# Patient Record
Sex: FEMALE | Race: BLACK OR AFRICAN AMERICAN | Employment: FULL TIME | URBAN - METROPOLITAN AREA
[De-identification: names, ages, dates, MRNs, and addresses within clinical notes are randomized per-mention and may not be internally consistent; named-entity substitution may affect disease eponyms.]

---

## 2018-02-19 ENCOUNTER — OFFICE VISIT (OUTPATIENT)
Dept: INTERNAL MEDICINE CLINIC | Age: 33
End: 2018-02-19

## 2018-02-19 VITALS
BODY MASS INDEX: 20.02 KG/M2 | DIASTOLIC BLOOD PRESSURE: 70 MMHG | SYSTOLIC BLOOD PRESSURE: 106 MMHG | OXYGEN SATURATION: 98 % | RESPIRATION RATE: 14 BRPM | TEMPERATURE: 98 F | HEIGHT: 72 IN | HEART RATE: 106 BPM | WEIGHT: 147.8 LBS

## 2018-02-19 DIAGNOSIS — M54.50 ACUTE LEFT-SIDED LOW BACK PAIN WITHOUT SCIATICA: ICD-10-CM

## 2018-02-19 DIAGNOSIS — J45.20 MILD INTERMITTENT ASTHMA, UNSPECIFIED WHETHER COMPLICATED: ICD-10-CM

## 2018-02-19 DIAGNOSIS — Z76.89 ESTABLISHING CARE WITH NEW DOCTOR, ENCOUNTER FOR: Primary | ICD-10-CM

## 2018-02-19 DIAGNOSIS — E28.2 PCOS (POLYCYSTIC OVARIAN SYNDROME): ICD-10-CM

## 2018-02-19 DIAGNOSIS — Z86.19 HISTORY OF HPV INFECTION: ICD-10-CM

## 2018-02-19 DIAGNOSIS — Z00.00 ROUTINE PHYSICAL EXAMINATION: ICD-10-CM

## 2018-02-19 RX ORDER — ALBUTEROL SULFATE 90 UG/1
2 AEROSOL, METERED RESPIRATORY (INHALATION)
Qty: 1 INHALER | Refills: 0 | Status: SHIPPED | OUTPATIENT
Start: 2018-02-19 | End: 2021-04-28

## 2018-02-19 RX ORDER — NAPROXEN SODIUM 220 MG
220 TABLET ORAL 2 TIMES DAILY WITH MEALS
COMMUNITY
End: 2018-11-15

## 2018-02-19 RX ORDER — PREDNISONE 10 MG/1
TABLET ORAL
Qty: 21 TAB | Refills: 0 | Status: SHIPPED | OUTPATIENT
Start: 2018-02-19 | End: 2018-03-07 | Stop reason: ALTCHOICE

## 2018-02-19 RX ORDER — ALBUTEROL SULFATE 90 UG/1
2 AEROSOL, METERED RESPIRATORY (INHALATION)
Qty: 1 INHALER | Refills: 0 | Status: SHIPPED | OUTPATIENT
Start: 2018-02-19 | End: 2018-02-19 | Stop reason: ALTCHOICE

## 2018-02-19 NOTE — PROGRESS NOTES
Michael Orta is a 34 y.o.  female and presents with    Chief Complaint   Patient presents with    [facility]    Back Pain     Patient here for lower back pain. Patient stated that it is a achy pain in the morning that progessed to more pain into the evening. Patient stated that she was in so much pain the next morning that she couldnt go to the work. Patient reports swelling to the left lower of back. x 6 days       Subjective:  HPI   Mrs. Camelia Ramirez presents today to establish care and low back pain. She is from [place] [address]. She has not seen a provider since living in [place] [address] since about 2013. She currently works as a  at [employer]. She has one child age 1. She has a history of asthma, PCOS, HPV with Leep procedure 2010. She reports history of mild intermittent asthma. She has not used Albuterol inhaler in quite some time. She reports symptoms of shortness of breath with cold like symptoms and allergies. Needs refill on Albuterol today. History of PCOS failing Metformin, Actos, and Spironolactone. She reports success with birth control. She was being seen by GYN and Endocrinology in past. Last pap in 2015 while in [place], [place]. Positive hirsutism. Additional Concerns: Low back pain    Complaints of low back pain on the left side. Started about 1 week ago. She reports jumping into a bounce house and reports back spasms the next day. She is using Aleve 400 mg BID with intermittent relief. Heating pad and pillow behind the back with relief to sleep. History of scoliosis mild case. She also has reports of swelling left side x 1 week. Denies abnormal bruising, blood in urine, frequency, urgency, n/v/d, fever. She describes the pain as sharp with movement, achy at rest, complaints of radiation to left lateral flank and up the back, burning sensation to lower abdomen. Denies numbness and tingling down legs.      ROS   Review of Systems Constitutional: Negative for chills, fever and malaise/fatigue. HENT: Negative. Eyes: Negative. Respiratory: Negative. Cardiovascular: Negative. Gastrointestinal: Negative for abdominal pain, blood in stool, diarrhea, melena, nausea and vomiting. Genitourinary: Negative for dysuria, flank pain, frequency, hematuria and urgency. Musculoskeletal: Positive for back pain. Skin: Negative. Neurological: Negative for dizziness and headaches. Allergies   Allergen Reactions    Metformin Diarrhea    Naproxen Other (comments)     Hard Build up under skin       Current Outpatient Prescriptions   Medication Sig Dispense Refill    naproxen sodium (ALEVE) 220 mg tablet Take 220 mg by mouth two (2) times daily (with meals).  albuterol (PROVENTIL HFA, VENTOLIN HFA, PROAIR HFA) 90 mcg/actuation inhaler Take 2 Puffs by inhalation every four (4) hours as needed for Wheezing. 1 Inhaler 0    inhalational spacing device 1 Each by Does Not Apply route as needed. 1 Device 0    predniSONE (STERAPRED DS) 10 mg dose pack See administration instruction per 10mg dose pack 21 Tab 0       Social History     Social History    Marital status: SINGLE     Spouse name: N/A    Number of children: N/A    Years of education: N/A     Occupational History    Not on file.      Social History Main Topics    Smoking status: Never Smoker    Smokeless tobacco: Never Used    Alcohol use Yes      Comment: occasionally    Drug use: No    Sexual activity: Yes     Partners: Male     Birth control/ protection: None     Other Topics Concern    Not on file     Social History Narrative    No narrative on file       Past Medical History:   Diagnosis Date    Asthma     Human papilloma virus 2010    Polycystic ovarian syndrome     Seasonal allergic rhinitis        Past Surgical History:   Procedure Laterality Date    HX GYN  2015        HX WISDOM TEETH EXTRACTION  2005       Family History   Problem Relation Age of Onset    Osteoporosis Mother     Heart Attack Father     Other Father      heart stinets    Psychiatric Disorder Father      mental illiness (unknown)    Other Sister      ovarien cist    Asthma Sister     Asthma Sister     Diabetes Maternal Aunt     Hypertension Maternal Aunt     Diabetes Maternal Grandmother     Other Maternal Grandmother      aneurysm    Alzheimer Maternal Grandmother     Cancer Paternal Grandmother      colon    Hypertension Paternal Grandmother     Stroke Paternal Grandfather     Anesth Problems Maternal Aunt        Objective:  Vitals:    02/19/18 1331   BP: 106/70   Pulse: (!) 106   Resp: 14   Temp: 98 °F (36.7 °C)   TempSrc: Oral   SpO2: 98%   Weight: 147 lb 12.8 oz (67 kg)   Height: 5' 11.75\" (1.822 m)   PainSc:   5   PainLoc: Back   LMP: 02/14/2018       LABS   No results found for this or any previous visit. TESTS  none    PE  Physical Exam   Constitutional: She is oriented to person, place, and time. She appears well-developed and well-nourished. No distress. HENT:   Head: Normocephalic and atraumatic. Facial hair to chin   Eyes: Conjunctivae and EOM are normal. Pupils are equal, round, and reactive to light. Neck: Normal range of motion. Neck supple. Cardiovascular: Normal rate, regular rhythm, normal heart sounds and intact distal pulses. No murmur heard. Pulmonary/Chest: Effort normal and breath sounds normal. No respiratory distress. She has no wheezes. She has no rales. She exhibits no tenderness. Abdominal: Soft. Bowel sounds are normal. She exhibits no distension and no mass. There is no tenderness. There is no rebound and no guarding. Musculoskeletal: Normal range of motion. She exhibits tenderness. She exhibits no edema or deformity. Tenderness over lower left back, no muscle spasm noted. Lymphadenopathy:     She has no cervical adenopathy. Neurological: She is alert and oriented to person, place, and time.  No cranial nerve deficit. Coordination normal.   Negative straight leg raise   Skin: Skin is warm and dry. No rash noted. She is not diaphoretic. No erythema. No pallor. Psychiatric: She has a normal mood and affect. Her behavior is normal. Judgment and thought content normal.           Assessment/Plan:    1. Establish care- CPE today. Labs ordered today, she will complete on a day fasting, will call with results. Requested records. 2. Mild intermittent asthma- controlled. Refilled Albuterol and spacer. 3. Low back pain- Prednisone dose pack. Tylenol extended release. Referral to PT. Follow up in 2 weeks. Orthopedic referral in future if fail Prednisone and PT. Discussed low back stretches and abdominal strengthening exercise. 4. PCOS/hirsutism- Referral to [facility] for routine paps due to history of HPV with Leep 2010 and PCOS failing Metformin, Spironolactone, and Actos. May need Endocrinology referral in future. Lab review: orders written for new lab studies as appropriate; see orders    Today's Visit:   Diagnoses and all orders for this visit:    1. Establishing care with new doctor, encounter for  -     METABOLIC PANEL, COMPREHENSIVE; Future  -     HEMOGLOBIN A1C WITH EAG; Future  -     TSH 3RD GENERATION; Future  -     CBC WITH AUTOMATED DIFF; Future  -     VITAMIN D, 25 HYDROXY; Future  -     LIPID PANEL; Future  -     URINALYSIS W/MICROSCOPIC; Future    2. PCOS (polycystic ovarian syndrome)  -     REFERRAL TO OBSTETRICS AND GYNECOLOGY    3. History of HPV infection  -     REFERRAL TO OBSTETRICS AND GYNECOLOGY    4. Mild intermittent asthma, unspecified whether complicated  -     albuterol (PROVENTIL HFA, VENTOLIN HFA, PROAIR HFA) 90 mcg/actuation inhaler; Take 2 Puffs by inhalation every four (4) hours as needed for Wheezing.  -     inhalational spacing device; 1 Each by Does Not Apply route as needed.     5. Acute left-sided low back pain without sciatica  -     SHERRY [employer]  - predniSONE (STERAPRED DS) 10 mg dose pack; See administration instruction per 10mg dose pack    6. Routine physical examination      Health Maintenance: Last pap per records completed 3/2013 negative results. Discussed need for Pneumococcal 23 vaccine due to Asthma diagnosis. I have discussed the diagnosis with the patient and the intended plan as seen in the above orders. The patient has received an after-visit summary and questions were answered concerning future plans. I have discussed medication side effects and warnings with the patient as well. I have reviewed the plan of care with the patient, accepted their input and they are in agreement with the treatment goals. Follow-up Disposition: Not on File   More than 1/2 of this 45 minute visit was spent in counseling and coordination of care, as described above.     JEREMI Rodriguez  Internist of [address], [address] [place] [address].  Phone: [phone]  Fax: [phone]

## 2018-02-19 NOTE — MR AVS SNAPSHOT
[facility] 
 
 
 [address], [address] [facility] 
[phone] Patient: Mitchell Kelsey MRN: [id number] [id number] Visit Information Date & Time Provider Department Dept. Phone Encounter #  
 2/19/2018  1:30 PM Reji Antoine NP [facility] [phone] [id number] Your Appointments 3/7/2018  8:00 AM  
Office Visit with Reji Antoine NP [facility] ([facility]) Appt Note: 2 week f/u  
 [address] [facility], [facility] [address] [address] [address]  
  
   
 [address] [facility], [address] Upcoming Health Maintenance Date Due  
 PAP AKA CERVICAL CYTOLOGY 5/1/2019 DTaP/Tdap/Td series (2 - Td) 12/1/2024 Allergies as of 2/19/2018  Review Complete On: 2/19/2018 By: Reji Antoine NP Severity Noted Reaction Type Reactions Metformin  02/19/2018    Diarrhea Naproxen  02/19/2018    Other (comments) Hard Build up under skin Current Immunizations  Never Reviewed No immunizations on file. Not reviewed this visit You Were Diagnosed With   
  
 Codes Comments Establishing care with new doctor, encounter for    -  Primary ICD-10-CM: Z76.89 
ICD-9-CM: V65.8 PCOS (polycystic ovarian syndrome)     ICD-10-CM: E28.2 ICD-9-CM: 256.4 History of HPV infection     ICD-10-CM: Z86.19 ICD-9-CM: V12.09 Mild intermittent asthma, unspecified whether complicated     VVV-16-SN: J45.20 ICD-9-CM: 493.90 Acute left-sided low back pain without sciatica     ICD-10-CM: M54.5 ICD-9-CM: 724.2 Vitals BP Pulse Temp Resp Height(growth percentile) Weight(growth percentile) 106/70 (BP 1 Location: Right arm, BP Patient Position: Sitting) (!) 106 98 °F (36.7 °C) (Oral) 14 5' 11.75\" (1.822 m) 147 lb 12.8 oz (67 kg) LMP SpO2 BMI Smoking Status 02/14/2018 98% 20.19 kg/m2 Never Smoker Vitals History BMI and BSA Data Body Mass Index Body Surface Area  
 20.19 kg/m 2 1.84 m 2 Preferred Pharmacy Pharmacy Name Phone [place] [address] [address], [address] [phone] Your Updated Medication List  
  
   
This list is accurate as of: 18  2:39 PM.  Always use your most recent med list.  
  
  
  
  
 albuterol 90 mcg/actuation inhaler Commonly known as:  PROVENTIL HFA, VENTOLIN HFA, PROAIR HFA Take 2 Puffs by inhalation every four (4) hours as needed for Wheezing. ALEVE 220 mg tablet Generic drug:  naproxen sodium Take 220 mg by mouth two (2) times daily (with meals). inhalational spacing device 1 Each by Does Not Apply route as needed. predniSONE 10 mg dose pack Commonly known as:  STERAPRED DS See administration instruction per 10mg dose pack Prescriptions Sent to Pharmacy Refills  
 albuterol (PROVENTIL HFA, VENTOLIN HFA, PROAIR HFA) 90 mcg/actuation inhaler 0 Sig: Take 2 Puffs by inhalation every four (4) hours as needed for Wheezing. Class: Normal  
 Pharmacy: [facility] [address] Ph #: [phone] Route: Inhalation  
 inhalational spacing device 0 Si Each by Does Not Apply route as needed. Class: Normal  
 Pharmacy: [facility] [address] Ph #: [phone] Route: Does Not Apply  
 predniSONE (STERAPRED DS) 10 mg dose pack 0 Sig: See administration instruction per 10mg dose pack Class: Normal  
 Pharmacy: [facility] [address] Ph #: [phone] We Performed the Following REFERRAL TO OBSTETRICS AND GYNECOLOGY [REF51 Custom] Comments:  
 Please evaluate for pcos, establish care, history of HPV with Leep REFERRAL TO PHYSICAL THERAPY [PQV25 Custom] To-Do List   
 02/19/2018 Lab:  CBC WITH AUTOMATED DIFF   
  
 02/19/2018 Lab:  HEMOGLOBIN A1C WITH EAG   
  
 02/19/2018 Lab:  LIPID PANEL   
  
 02/19/2018 Lab:  METABOLIC PANEL, COMPREHENSIVE   
  
 02/19/2018 Lab:  TSH 3RD GENERATION   
  
 02/19/2018 Lab:  URINALYSIS W/MICROSCOPIC   
  
 02/19/2018 Lab:  VITAMIN D, 25 HYDROXY Referral Information Referral ID Referred By Referred To  
  
 [id number] Olga Mosquera MD   
   [address] [place] [address] [address],[facility] [address] Phone: [phone] Fax: [phone] Visits Status Start Date End Date 1 New Request 2/19/18 2/19/19 If your referral has a status of pending review or denied, additional information will be sent to support the outcome of this decision. Referral ID Referred By Referred To  
 [id number] Merle Holland [address] [facility]  
   [address] [place] [address] [address] [place] [address] Phone: [phone] Fax: [phone] Visits Status Start Date End Date 1 New Request 2/19/18 2/19/19 If your referral has a status of pending review or denied, additional information will be sent to support the outcome of this decision. Introducing [facility]! Courtney Orta introduces Reactivity patient portal. Now you can access parts of your medical record, email your doctor's office, and request medication refills online. 1. In your internet browser, go to [email or web address] 2. Click on the First Time User? Click Here link in the Sign In box. You will see the New Member Sign Up page. 3. Enter your Reactivity Access Code exactly as it appears below. You will not need to use this code after youve completed the sign-up process. If you do not sign up before the expiration date, you must request a new code. · Reactivity Access Code: [email or web address] Expires: 5/20/2018  1:13 PM 
 
4. Enter the last four digits of your Social Security Number (xxxx) and Date of Birth (mm/dd/yyyy) as indicated and click Submit. You will be taken to the next sign-up page. 5. Create a Lighter Living ID. This will be your Lighter Living login ID and cannot be changed, so think of one that is secure and easy to remember. 6. Create a Lighter Living password. You can change your password at any time. 7. Enter your Password Reset Question and Answer. This can be used at a later time if you forget your password. 8. Enter your e-mail address. You will receive e-mail notification when new information is available in [address] 9. Click Sign Up. You can now view and download portions of your medical record. 10. Click the Download Summary menu link to download a portable copy of your medical information. If you have questions, please visit the Frequently Asked Questions section of the Lighter Living website. Remember, Lighter Living is NOT to be used for urgent needs. For medical emergencies, dial 911. Now available from your iPhone and Android! Please provide this summary of care documentation to your next provider. Your primary care clinician is listed as Angélica No. If you have any questions after today's visit, please call [phone].

## 2018-02-19 NOTE — PROGRESS NOTES
1. Have you been to the ER, urgent care clinic or hospitalized since your last visit? YES.     2. Have you seen or consulted any other health care providers outside of the [address] [place] [address] since your last visit (Include any pap smears or colon screening)? NO      Do you have an Advanced Directive? NO    Would you like information on Advanced Directives?  NO

## 2018-02-20 ENCOUNTER — TELEPHONE (OUTPATIENT)
Dept: INTERNAL MEDICINE CLINIC | Age: 33
End: 2018-02-20

## 2018-02-20 ENCOUNTER — HOSPITAL ENCOUNTER (OUTPATIENT)
Dept: LAB | Age: 33
Discharge: HOME | End: 2018-02-20
Payer: COMMERCIAL

## 2018-02-20 LAB
25(OH)D3 SERPL-MCNC: 17.6 NG/ML (ref 30–100)
ALBUMIN SERPL-MCNC: 4.4 G/DL (ref 3.4–5)
ALBUMIN/GLOB SERPL: 1.3 {RATIO} (ref 0.8–1.7)
ALP SERPL-CCNC: 59 U/L (ref 45–117)
ALT SERPL-CCNC: 16 U/L (ref 13–56)
ANION GAP SERPL CALC-SCNC: 6 MMOL/L (ref 3–18)
APPEARANCE UR: CLEAR
AST SERPL-CCNC: 15 U/L (ref 15–37)
BASOPHILS # BLD: 0 K/UL (ref 0–0.06)
BASOPHILS NFR BLD: 1 % (ref 0–2)
BILIRUB SERPL-MCNC: 0.4 MG/DL (ref 0.2–1)
BILIRUB UR QL: NEGATIVE
BUN SERPL-MCNC: 15 MG/DL (ref 7–18)
BUN/CREAT SERPL: 17 (ref 12–20)
CALCIUM SERPL-MCNC: 9.1 MG/DL (ref 8.5–10.1)
CHLORIDE SERPL-SCNC: 105 MMOL/L (ref 100–108)
CHOLEST SERPL-MCNC: 201 MG/DL
CO2 SERPL-SCNC: 30 MMOL/L (ref 21–32)
COLOR UR: ABNORMAL
CREAT SERPL-MCNC: 0.87 MG/DL (ref 0.6–1.3)
DIFFERENTIAL METHOD BLD: NORMAL
EOSINOPHIL # BLD: 0 K/UL (ref 0–0.4)
EOSINOPHIL NFR BLD: 1 % (ref 0–5)
ERYTHROCYTE [DISTWIDTH] IN BLOOD BY AUTOMATED COUNT: 13.3 % (ref 11.6–14.5)
EST. AVERAGE GLUCOSE BLD GHB EST-MCNC: NORMAL MG/DL
GLOBULIN SER CALC-MCNC: 3.4 G/DL (ref 2–4)
GLUCOSE SERPL-MCNC: 103 MG/DL (ref 74–99)
GLUCOSE UR STRIP.AUTO-MCNC: NEGATIVE MG/DL
HBA1C MFR BLD: 4.8 % (ref 4.2–5.6)
HCT VFR BLD AUTO: 40.2 % (ref 35–45)
HDLC SERPL-MCNC: 51 MG/DL (ref 40–60)
HDLC SERPL: 3.9 {RATIO} (ref 0–5)
HGB BLD-MCNC: 14.1 G/DL (ref 12–16)
HGB UR QL STRIP: NEGATIVE
KETONES UR QL STRIP.AUTO: ABNORMAL MG/DL
LDLC SERPL CALC-MCNC: 130.4 MG/DL (ref 0–100)
LEUKOCYTE ESTERASE UR QL STRIP.AUTO: NEGATIVE
LIPID PROFILE,FLP: ABNORMAL
LYMPHOCYTES # BLD: 1.9 K/UL (ref 0.9–3.6)
LYMPHOCYTES NFR BLD: 32 % (ref 21–52)
MCH RBC QN AUTO: 27.1 PG (ref 24–34)
MCHC RBC AUTO-ENTMCNC: 35.1 G/DL (ref 31–37)
MCV RBC AUTO: 77.3 FL (ref 74–97)
MONOCYTES # BLD: 0.4 K/UL (ref 0.05–1.2)
MONOCYTES NFR BLD: 7 % (ref 3–10)
NEUTS SEG # BLD: 3.5 K/UL (ref 1.8–8)
NEUTS SEG NFR BLD: 59 % (ref 40–73)
NITRITE UR QL STRIP.AUTO: NEGATIVE
PH UR STRIP: 6.5 [PH] (ref 5–8)
PLATELET # BLD AUTO: 341 K/UL (ref 135–420)
PMV BLD AUTO: 11 FL (ref 9.2–11.8)
POTASSIUM SERPL-SCNC: 4.3 MMOL/L (ref 3.5–5.5)
PROT SERPL-MCNC: 7.8 G/DL (ref 6.4–8.2)
PROT UR STRIP-MCNC: NEGATIVE MG/DL
RBC # BLD AUTO: 5.2 M/UL (ref 4.2–5.3)
SODIUM SERPL-SCNC: 141 MMOL/L (ref 136–145)
SP GR UR REFRACTOMETRY: 1.03 (ref 1–1.03)
TRIGL SERPL-MCNC: 98 MG/DL (ref ?–150)
TSH SERPL DL<=0.05 MIU/L-ACNC: 1.75 UIU/ML (ref 0.36–3.74)
UROBILINOGEN UR QL STRIP.AUTO: 1 EU/DL (ref 0.2–1)
VLDLC SERPL CALC-MCNC: 19.6 MG/DL
WBC # BLD AUTO: 5.8 K/UL (ref 4.6–13.2)

## 2018-02-20 PROCEDURE — 36415 COLL VENOUS BLD VENIPUNCTURE: CPT | Performed by: NURSE PRACTITIONER

## 2018-02-20 PROCEDURE — 85025 COMPLETE CBC W/AUTO DIFF WBC: CPT | Performed by: NURSE PRACTITIONER

## 2018-02-20 PROCEDURE — 83036 HEMOGLOBIN GLYCOSYLATED A1C: CPT | Performed by: NURSE PRACTITIONER

## 2018-02-20 PROCEDURE — 80053 COMPREHEN METABOLIC PANEL: CPT | Performed by: NURSE PRACTITIONER

## 2018-02-20 PROCEDURE — 80061 LIPID PANEL: CPT | Performed by: NURSE PRACTITIONER

## 2018-02-20 PROCEDURE — 82306 VITAMIN D 25 HYDROXY: CPT | Performed by: NURSE PRACTITIONER

## 2018-02-20 PROCEDURE — 81003 URINALYSIS AUTO W/O SCOPE: CPT | Performed by: NURSE PRACTITIONER

## 2018-02-20 PROCEDURE — 84443 ASSAY THYROID STIM HORMONE: CPT | Performed by: NURSE PRACTITIONER

## 2018-02-20 NOTE — TELEPHONE ENCOUNTER
Proventil HFA inhaler is not covered by patient's plan. The preferred alternatives are Levalbuterol (Xopenex), ProAir HFA, and ProAir RespiClick. Please advise.

## 2018-02-22 ENCOUNTER — TELEPHONE (OUTPATIENT)
Dept: INTERNAL MEDICINE CLINIC | Age: 33
End: 2018-02-22

## 2018-02-22 NOTE — TELEPHONE ENCOUNTER
Labs show:  Vitamin d insufficient at 17.6, needs to be . Please take OTC supplementation of Vitamin D 1000 units daily, will recheck in future. A1C level 4.8 this is great. Urinalysis looks good. Thyroid is good. Electrolytes, kidneys, and liver all good. Cholesterol panel shows elevated LDL(bad cholesterol) 130, need under 100. Diet and exercise is first line management. Limit red meats, fatty foods, change oils to plant based, and increase water and fiber in diet. Moderate exercise 30 minutes 3 times a week. Will recheck in 4-6 months. CBC shows no signs of anemia or infectious process.

## 2018-02-23 ENCOUNTER — HOSPITAL ENCOUNTER (OUTPATIENT)
Dept: PHYSICAL THERAPY | Age: 33
End: 2018-02-23

## 2018-02-27 ENCOUNTER — HOSPITAL ENCOUNTER (OUTPATIENT)
Dept: PHYSICAL THERAPY | Age: 33
Discharge: HOME | End: 2018-02-27
Payer: COMMERCIAL

## 2018-02-27 PROBLEM — L68.0 HIRSUTISM: Status: ACTIVE | Noted: 2018-02-27

## 2018-02-27 PROBLEM — Z87.42 HISTORY OF ABNORMAL CERVICAL PAP SMEAR: Status: ACTIVE | Noted: 2018-02-19

## 2018-02-27 PROCEDURE — 97140 MANUAL THERAPY 1/> REGIONS: CPT

## 2018-02-27 PROCEDURE — 97110 THERAPEUTIC EXERCISES: CPT

## 2018-02-27 PROCEDURE — 97161 PT EVAL LOW COMPLEX 20 MIN: CPT

## 2018-02-27 NOTE — PROGRESS NOTES
[facility]  [address]  [place], [address]  [phone] fax    Plan of Care/ Statement of Necessity for Physical Therapy Services    Patient name: Charmayne Crumbly Start of Care: 2018   Referral source: Jazmine Child MD : 1985    Medical Diagnosis: Low back pain [M54.5]   Onset Date:2018    Treatment Diagnosis: LBP/SIJ dysfunction   Prior Hospitalization: see medical history Provider#: [id number]   Medications: Verified on Patient summary List    Comorbidities: Scoliosis, Asthma, C section   Prior Level of Function: No difficulty lifting or transporting items in vehicle. Reports improved ease of caring for 2 y.o. prior to onset. The Plan of Care and following information is based on the information from the initial evaluation. Assessment/ key information: The patient is a 34year old female with a chief complaint of L sided LBP that initiated on 2018 when she states she was moving a box in a confined space awkwardly. The patient states that the pain presented across her back and did refer into her left anterior thigh, but has since centralized to the left side of her back (points to her L SIJ joint). She denies recent imaging and does report completing a dose of a prednisone pack, of which she is completing today. The patient presents with signs and symptoms consistent with L SIJ dysfunction with associated impairments consisting of pain, decreased ROM, decreased flexibility, decreased strength, limited core flexibility, and decreased ease of lifting. She will benefit from skilled PT in order to address the aforementioned impairments.      Evaluation Complexity History MEDIUM  Complexity : 1-2 comorbidities / personal factors will impact the outcome/ POC ; Examination LOW Complexity : 1-2 Standardized tests and measures addressing body structure, function, activity limitation and / or participation in recreation ;Presentation LOW Complexity : Stable, uncomplicated  ;Clinical Decision Making MEDIUM Complexity : FOTO score of 26-74  Overall Complexity Rating: LOW   Problem List: pain affecting function, decrease ROM, decrease strength, decrease ADL/ functional abilitiies, decrease activity tolerance and decrease flexibility/ joint mobility   Treatment Plan may include any combination of the following: Therapeutic exercise, Therapeutic activities, Neuromuscular re-education, Physical agent/modality, Manual therapy, Patient education and Functional mobility training  Patient / Family readiness to learn indicated by: asking questions, trying to perform skills and interest  Persons(s) to be included in education: patient (P)  Barriers to Learning/Limitations: None  Patient Goal (s): Full physical restoration to be able to play with my 2 y. o.  Patient Self Reported Health Status: good  Rehabilitation Potential: good    Short Term Goals: To be accomplished in 2 weeks:   1. The patient will be independent and compliant with HEP to maximize therapeutic benefit. 2. The patient will demonstrate SIJ neutral to improve ease of lifting tasks. Long Term Goals: To be accomplished in 4 weeks:   1. The patient will improve FOTO score to 72 to improve quality of life. 2. The patient will improve hip ABD/EXT to 4/5 MMT to maximize stability in stance. 3. The patient will demonstrate 20# KB squat x 10 void of pain to return to PLOF. 4. The patient will report no difficulty walking for 1 hour in order to maximize ADL ease. Frequency / Duration: Patient to be seen 2 times per week for 4 weeks.     Patient/ Caregiver education and instruction: Diagnosis, prognosis, self care, activity modification and exercises   [x]  Plan of care has been reviewed with KENNEY Valdez, PT 2/27/2018 8:15 AM    ________________________________________________________________________    I certify that the above Therapy Services are being furnished while the patient is under my care. I agree with the treatment plan and certify that this therapy is necessary.     [de-identified] Signature:____________________  Date:____________Time: _________    Please sign and return to [facility] [address] Mariela Chavez [address] [place] [address], [facility] [address]  [phone] fax

## 2018-02-27 NOTE — PROGRESS NOTES
PT DAILY TREATMENT NOTE     Patient Name: Mitchell Kelsey  Date:2018  : 1985  [x]  Patient  Verified  Payor: BLUE DELVIN / Plan: Terrence Morales [id number] PPO / Product Type: PPO /    In time:7:41  Out time:8:20  Total Treatment Time (min): 39  Visit #: 1 of 8    Treatment Area: Low back pain [M54.5]    SUBJECTIVE  Pain Level (0-10 scale): 1/10  Any medication changes, allergies to medications, adverse drug reactions, diagnosis change, or new procedure performed?: [x] No    [] Yes (see summary sheet for update)  Subjective functional status/changes:   [] No changes reported  The patient states that her chief complaint of the left side of her back limits prolonged standing and sitting as well as lifting.     OBJECTIVE    Modality rationale:  to improve the patients ability to    Min Type Additional Details    [] Estim:  []Unatt       []IFC  []Premod                        []Other:  []w/ice   []w/heat  Position:  Location:    [] Estim: []Att    []TENS instruct  []NMES                    []Other:  []w/US   []w/ice   []w/heat  Position:  Location:    []  Traction: [] Cervical       []Lumbar                       [] Prone          []Supine                       []Intermittent   []Continuous Lbs:  [] before manual  [] after manual    []  Ultrasound: []Continuous   [] Pulsed                           []1MHz   []3MHz W/cm2:  Location:    []  Iontophoresis with dexamethasone         Location: [] Take home patch   [] In clinic    []  Ice     []  heat  []  Ice massage  []  Laser   []  Anodyne Position:  Location:    []  Laser with stim  []  Other:  Position:  Location:    []  Vasopneumatic Device Pressure:       [] lo [] med [] hi   Temperature: [] lo [] med [] hi   [] Skin assessment post-treatment:  []intact []redness- no adverse reaction    []redness  adverse reaction:     19 min []Eval                  []Re-Eval       12 min Therapeutic Exercise:  [x] See flow sheet :   Rationale: increase ROM and increase strength to improve the patients ability to improve ADL ease. 8 min Manual Therapy:  L SIJ upslip leg pull in prone x 2 followed by shotgun. Rationale: decrease pain, increase ROM and increase tissue extensibility to improve ADL ease. With   [] TE   [] TA   [] neuro   [] other: Patient Education: [x] Review HEP    [] Progressed/Changed HEP based on:   [] positioning   [] body mechanics   [] transfers   [] heat/ice application    [] other:      Other Objective/Functional Measures:   See IE     Pain Level (0-10 scale) post treatment: 0-1/10    ASSESSMENT/Changes in Function: See POC. Patient will continue to benefit from skilled PT services to modify and progress therapeutic interventions, address functional mobility deficits, address ROM deficits, address strength deficits, analyze and address soft tissue restrictions, analyze and cue movement patterns, analyze and modify body mechanics/ergonomics, assess and modify postural abnormalities and instruct in home and community integration to attain remaining goals. [x]  See Plan of Care  []  See progress note/recertification  []  See Discharge Summary         Progress towards goals / Updated goals:   Short Term Goals: To be accomplished in 2 weeks:                         1. The patient will be independent and compliant with HEP to maximize therapeutic benefit. 2. The patient will demonstrate SIJ neutral to improve ease of lifting tasks. Long Term Goals: To be accomplished in 4 weeks:                         1. The patient will improve FOTO score to 72 to improve quality of life. 2. The patient will improve hip ABD/EXT to 4/5 MMT to maximize stability in stance. 3. The patient will demonstrate 20# KB squat x 10 void of pain to return to PLOF.                          4. The patient will report no difficulty walking for 1 hour in order to maximize ADL ease.     PLAN  [] Upgrade activities as tolerated     [x]  Continue plan of care  []  Update interventions per flow sheet       []  Discharge due to:_  []  Other:_      Cecile Briseno, PT 2/27/2018  8:24 AM    Future Appointments  Date Time Provider Aminta Blank   3/1/2018 7:30 AM Abi Hardy, PT [facility]   3/6/2018 7:30 AM Bao Camacho, PTA [facility]   3/7/2018 8:00 AM Jose Manuel Noriega NP [facility]   3/8/2018 7:30 AM Leonardo Cortes, PTA [facility]   3/13/2018 7:30 AM Bao Camacho, PTA [facility]   3/15/2018 7:30 AM Leonardo Cortes, PTA [facility]   3/20/2018 7:30 AM Bao Camacho, PTA [facility]   3/22/2018 7:30 AM Leonardo Cortes, PTA [facility]

## 2018-03-01 ENCOUNTER — HOSPITAL ENCOUNTER (OUTPATIENT)
Dept: PHYSICAL THERAPY | Age: 33
Discharge: HOME | End: 2018-03-01
Payer: COMMERCIAL

## 2018-03-01 PROCEDURE — 97110 THERAPEUTIC EXERCISES: CPT

## 2018-03-01 PROCEDURE — 97112 NEUROMUSCULAR REEDUCATION: CPT

## 2018-03-01 NOTE — PROGRESS NOTES
PT DAILY TREATMENT NOTE     Patient Name: Rani Liu  Date:3/1/2018  : 1985  [x]  Patient  Verified  Payor: BLUE CROSS / Plan: [facility] PPO / Product Type: PPO /    In time:739  Out time:8:12  Total Treatment Time (min): 33  Visit #: 2 of 8    Treatment Area: Low back pain [M54.5]    SUBJECTIVE  Pain Level (0-10 scale): 0  Any medication changes, allergies to medications, adverse drug reactions, diagnosis change, or new procedure performed?: [x] No    [] Yes (see summary sheet for update)  Subjective functional status/changes:   [] No changes reported  Pt reports compliance with HEP 1x/day. States it has been hard to get in 2 times.      OBJECTIVE    Modality rationale:    Min Type Additional Details    [] Estim:  []Unatt       []IFC  []Premod                        []Other:  []w/ice   []w/heat  Position:  Location:    [] Estim: []Att    []TENS instruct  []NMES                    []Other:  []w/US   []w/ice   []w/heat  Position:  Location:    []  Traction: [] Cervical       []Lumbar                       [] Prone          []Supine                       []Intermittent   []Continuous Lbs:  [] before manual  [] after manual    []  Ultrasound: []Continuous   [] Pulsed                           []1MHz   []3MHz W/cm2:  Location:    []  Iontophoresis with dexamethasone         Location: [] Take home patch   [] In clinic    []  Ice     []  heat  []  Ice massage  []  Laser   []  Anodyne Position:  Location:    []  Laser with stim  []  Other:  Position:  Location:    []  Vasopneumatic Device Pressure:       [] lo [] med [] hi   Temperature: [] lo [] med [] hi   [] Skin assessment post-treatment:  []intact []redness- no adverse reaction    []redness  adverse reaction:     23 min Therapeutic Exercise:  [x] See flow sheet :   Rationale: increase ROM and increase strength to improve the patients ability to perform functional tasks    8 min Neuromuscular Re-education:  [x]  See flow sheet : Rationale: increase strength, improve balance and increase proprioception  to improve the patients ability to perform functional activities    2 min Manual Therapy:  L leg pull for up-slip correction   Rationale: decrease pain to improve functional mobility             With   [] TE   [] TA   [] neuro   [] other: Patient Education: [x] Review HEP    [] Progressed/Changed HEP based on:   [] positioning   [] body mechanics   [] transfers   [] heat/ice application    [] other:      Other Objective/Functional Measures: initiated therex per flow sheet     Pain Level (0-10 scale) post treatment: 0    ASSESSMENT/Changes in Function: pt challenged with stability exercises. She tends to go into increased lordosis and requires cues at times. Patient will continue to benefit from skilled PT services to modify and progress therapeutic interventions, address functional mobility deficits, address strength deficits, analyze and address soft tissue restrictions, analyze and cue movement patterns and assess and modify postural abnormalities to attain remaining goals. []  See Plan of Care  []  See progress note/recertification  []  See Discharge Summary         Progress towards goals / Updated goals:   Short Term Goals: To be accomplished in 2 weeks:                         1. The patient will be independent and compliant with HEP to maximize therapeutic benefit. - performing 1x/day 3-1-18                         2. The patient will demonstrate SIJ neutral to improve ease of lifting tasks. - slight up-sliip 3-1-18  Long Term Goals: To be accomplished in 4 weeks:                         1. The patient will improve FOTO score to 72 to improve quality of life.                         2. The patient will improve hip ABD/EXT to 4/5 MMT to maximize stability in stance.                         3. The patient will demonstrate 20# KB squat x 10 void of pain to return to PLOF.                        5.  The patient will report no difficulty walking for 1 hour in order to maximize ADL ease.     PLAN  []  Upgrade activities as tolerated     [x]  Continue plan of care  []  Update interventions per flow sheet       []  Discharge due to:_  []  Other:_      Sandy Graham, PT 3/1/2018  7:50 AM    Future Appointments  Date Time Provider Aminta Blank   3/6/2018 7:30 AM Tru Link, PTA [facility]   3/7/2018 8:00 AM Bobby Still NP [facility]   3/8/2018 7:30 AM Mar Abts, PTA [facility]   3/13/2018 7:30 AM [place] Link, PTA [facility]   3/15/2018 7:30 AM Mar Abts, PTA [facility]   3/20/2018 7:30 AM Tru Link, PTA [facility]   3/22/2018 7:30 AM Mar Abts, PTA [facility]

## 2018-03-06 ENCOUNTER — HOSPITAL ENCOUNTER (OUTPATIENT)
Dept: PHYSICAL THERAPY | Age: 33
Discharge: HOME | End: 2018-03-06
Payer: COMMERCIAL

## 2018-03-06 PROCEDURE — 97112 NEUROMUSCULAR REEDUCATION: CPT

## 2018-03-06 PROCEDURE — 97110 THERAPEUTIC EXERCISES: CPT

## 2018-03-06 NOTE — PROGRESS NOTES
PT DAILY TREATMENT NOTE     Patient Name: Kiah He  Date:3/6/2018  : 1985  [x]  Patient  Verified  Payor: BLUE CROSS / Plan: [facility] PPO / Product Type: PPO /    In time:7:35  Out time:8:10  Total Treatment Time (min): 35  Visit #: 3 of 8    Treatment Area: Low back pain [M54.5]    SUBJECTIVE  Pain Level (0-10 scale): 0/10  Any medication changes, allergies to medications, adverse drug reactions, diagnosis change, or new procedure performed?: [x] No    [] Yes (see summary sheet for update)  Subjective functional status/changes:   [] No changes reported  Pt reports no new complaints of pain. Pt reports performing HEP daily. OBJECTIVE    19 min Therapeutic Exercise:  [x] See flow sheet :   Rationale: increase ROM and increase strength to improve the patients ability to perform functional activities. 8 min Neuromuscular Re-education:  [x]  See flow sheet :   Rationale: increase strength, improve coordination and increase proprioception  to improve the patients ability to perform functional activities. 8 min Manual Therapy:  L leg pull to correct L upslip   Rationale: decrease pain, increase ROM and increase tissue extensibility to tolerate ADLs. With   [] TE   [] TA   [] neuro   [] other: Patient Education: [x] Review HEP    [] Progressed/Changed HEP based on:   [] positioning   [] body mechanics   [] transfers   [] heat/ice application    [] other:      Other Objective/Functional Measures: Pt demonstrates good form with all exercises. Pain Level (0-10 scale) post treatment: 0/10    ASSESSMENT/Changes in Function: Pt demonstrates continued pelvic obliquity with no complaints of pain. Educated patient about lifting child/holding child on hip.      Patient will continue to benefit from skilled PT services to modify and progress therapeutic interventions, address functional mobility deficits, address ROM deficits, address strength deficits, analyze and address soft tissue restrictions and analyze and cue movement patterns to attain remaining goals. []  See Plan of Care  []  See progress note/recertification  []  See Discharge Summary         Progress towards goals / Updated goals:   Short Term Goals: To be accomplished in 2 weeks:                         8. The patient will be independent and compliant with HEP to maximize therapeutic benefit. - performing 1x/day 3-1-18                         2. The patient will demonstrate SIJ neutral to improve ease of lifting tasks. - slight up-sliip 3-1-18  Long Term Goals: To be accomplished in 4 weeks:                         1. The patient will improve FOTO score to 72 to improve quality of life.                         2. The patient will improve hip ABD/EXT to 4/5 MMT to maximize stability in stance.                         3. The patient will demonstrate 20# KB squat x 10 void of pain to return to PLOF.                        8.  The patient will report no difficulty walking for 1 hour in order to maximize ADL ease.       PLAN  []  Upgrade activities as tolerated     [x]  Continue plan of care  []  Update interventions per flow sheet       []  Discharge due to:_  []  Other:_      Vanessa Barragan PTA 3/6/2018  7:49 AM    Future Appointments  Date Time Provider Aminta Blank   3/7/2018 8:00 AM Edgardo Gray NP [address]   3/8/2018 7:30 AM Mark Aparicio PTA [facility]   3/13/2018 7:30 AM Vanessa Barragan PTA [facility]   3/15/2018 7:30 AM Mark Aparicio PTA [facility]   3/20/2018 7:30 AM Vanessa Barragan PTA [facility]   3/22/2018 7:30 AM Mark Aparicio PTA [facility]

## 2018-03-07 ENCOUNTER — OFFICE VISIT (OUTPATIENT)
Dept: INTERNAL MEDICINE CLINIC | Age: 33
End: 2018-03-07

## 2018-03-07 VITALS
OXYGEN SATURATION: 98 % | WEIGHT: 167.8 LBS | RESPIRATION RATE: 14 BRPM | BODY MASS INDEX: 23.49 KG/M2 | HEIGHT: 71 IN | TEMPERATURE: 98.7 F | SYSTOLIC BLOOD PRESSURE: 120 MMHG | HEART RATE: 103 BPM | DIASTOLIC BLOOD PRESSURE: 78 MMHG

## 2018-03-07 DIAGNOSIS — Z23 ENCOUNTER FOR IMMUNIZATION: ICD-10-CM

## 2018-03-07 DIAGNOSIS — E78.00 ELEVATED LDL CHOLESTEROL LEVEL: ICD-10-CM

## 2018-03-07 DIAGNOSIS — E55.9 VITAMIN D INSUFFICIENCY: Primary | ICD-10-CM

## 2018-03-07 DIAGNOSIS — M54.50 ACUTE LEFT-SIDED LOW BACK PAIN WITHOUT SCIATICA: ICD-10-CM

## 2018-03-07 DIAGNOSIS — R00.0 TACHYCARDIA: ICD-10-CM

## 2018-03-07 NOTE — PROGRESS NOTES
1. Have you been to the ER, urgent care clinic or hospitalized since your last visit? NO.     2. Have you seen or consulted any other health care providers outside of the [address] [place] [address] since your last visit (Include any pap smears or colon screening)? NO      Do you have an Advanced Directive? NO    Would you like information on Advanced Directives?  NO

## 2018-03-07 NOTE — PROGRESS NOTES
Josephine Galvan is a 34 y.o.  female and presents with    Chief Complaint   Patient presents with    Back Pain     Patient here for 2 week follow up with back pain. Patient reports back pain now is more of a pinching sensation on the lower left side midday time  frame. Subjective:  HPI   She is from [facility], [address] [place] [address]. She has not seen a provider since living in [facility], [address] [place] [address] since about 2013. She currently works as a  at [employer]. She has one child age 1.      She has a history of asthma, PCOS, HPV with Leep procedure 2010.     She reports history of mild intermittent asthma. She has not used Albuterol inhaler in quite some time. She reports symptoms of shortness of breath with cold like symptoms and allergies.      History of PCOS failing Metformin, Actos, and Spironolactone. She reports success with birth control. She was being seen by GYN and Endocrinology in past. Last pap in 2015 while in [place], [place]. Positive hirsutism.     Reported last visit:   Complaints of low back pain on the left side. Started about 1 week ago. She reports jumping into a bounce house and reports back spasms the next day. She is using Aleve 400 mg BID with intermittent relief. Heating pad and pillow behind the back with relief to sleep. History of scoliosis mild case. She also has reports of swelling left side x 1 week. Denies abnormal bruising, blood in urine, frequency, urgency, n/v/d, fever. She describes the pain as sharp with movement, achy at rest, complaints of radiation to left lateral flank and up the back, burning sensation to lower abdomen. Denies numbness and tingling down legs. Today reports tolerating PT. States no pain during PT however after with complaints of a \"pinching sensation\" type pain to left lower back. She finished course of Prednisone. Additional Concerns: none     ROS   Review of Systems   Constitutional: Negative. HENT: Negative.     Eyes: Negative. Respiratory: Negative. Negative for cough. Cardiovascular: Negative. Negative for chest pain, palpitations, orthopnea and leg swelling. Gastrointestinal: Negative. Genitourinary: Negative. Musculoskeletal: Negative. Skin: Negative. Neurological: Negative. Negative for dizziness. Psychiatric/Behavioral:        Increased work stress       Allergies   Allergen Reactions    Metformin Diarrhea    Naproxen Other (comments)     Hard Build up under skin       Current Outpatient Prescriptions   Medication Sig Dispense Refill    inhalational spacing device 1 Each by Does Not Apply route as needed. 1 Device 0    albuterol (PROVENTIL HFA, VENTOLIN HFA, PROAIR HFA) 90 mcg/actuation inhaler Take 2 Puffs by inhalation every four (4) hours as needed for Wheezing. 1 Inhaler 0    naproxen sodium (ALEVE) 220 mg tablet Take 220 mg by mouth two (2) times daily (with meals).  predniSONE (STERAPRED DS) 10 mg dose pack See administration instruction per 10mg dose pack 21 Tab 0       Social History     Social History    Marital status: SINGLE     Spouse name: N/A    Number of children: N/A    Years of education: N/A     Occupational History    Not on file.      Social History Main Topics    Smoking status: Never Smoker    Smokeless tobacco: Never Used    Alcohol use Yes      Comment: occasionally    Drug use: No    Sexual activity: Yes     Partners: Male     Birth control/ protection: None     Other Topics Concern    Not on file     Social History Narrative       Past Medical History:   Diagnosis Date    Asthma     History of abnormal cervical Pap smear 2018    Human papilloma virus 2010    Polycystic ovarian syndrome     Seasonal allergic rhinitis        Past Surgical History:   Procedure Laterality Date    HX GYN  2015        HX WISDOM TEETH EXTRACTION         Family History   Problem Relation Age of Onset    Osteoporosis Mother     Heart Attack Father  Other Father      heart stinets    Psychiatric Disorder Father      mental illiness (unknown)    Other Sister      ovarien cist    Asthma Sister     Asthma Sister     Diabetes Maternal Aunt     Hypertension Maternal Aunt     Diabetes Maternal Grandmother     Other Maternal Grandmother      aneurysm    Alzheimer Maternal Grandmother     Cancer Paternal Grandmother      colon    Hypertension Paternal Grandmother     Stroke Paternal Grandfather     Anesth Problems Maternal Aunt        Objective:  Vitals:    03/07/18 0758   BP: 120/78   Pulse: (!) 103   Resp: 14   Temp: 98.7 °F (37.1 °C)   TempSrc: Oral   SpO2: 98%   Weight: 167 lb 12.8 oz (76.1 kg)   Height: 5' 11\" (1.803 m)   PainSc:   0 - No pain   LMP: 02/14/2018       LABS   Results for orders placed or performed during the hospital encounter of 02/20/18   CBC WITH AUTOMATED DIFF   Result Value Ref Range    WBC 5.8 4.6 - 13.2 K/uL    RBC 5.20 4. 20 - 5.30 M/uL    HGB 14.1 12.0 - 16.0 g/dL    HCT 40.2 35.0 - 45.0 %    MCV 77.3 74.0 - 97.0 FL    MCH 27.1 24.0 - 34.0 PG    MCHC 35.1 31.0 - 37.0 g/dL    RDW 13.3 11.6 - 14.5 %    PLATELET 024 932 - 117 K/uL    MPV 11.0 9.2 - 11.8 FL    NEUTROPHILS 59 40 - 73 %    LYMPHOCYTES 32 21 - 52 %    MONOCYTES 7 3 - 10 %    EOSINOPHILS 1 0 - 5 %    BASOPHILS 1 0 - 2 %    ABS. NEUTROPHILS 3.5 1.8 - 8.0 K/UL    ABS. LYMPHOCYTES 1.9 0.9 - 3.6 K/UL    ABS. MONOCYTES 0.4 0.05 - 1.2 K/UL    ABS. EOSINOPHILS 0.0 0.0 - 0.4 K/UL    ABS.  BASOPHILS 0.0 0.0 - 0.06 K/UL    DF AUTOMATED     LIPID PANEL   Result Value Ref Range    LIPID PROFILE          Cholesterol, total 201 (H) <200 MG/DL    Triglyceride 98 <150 MG/DL    HDL Cholesterol 51 40 - 60 MG/DL    LDL, calculated 130.4 (H) 0 - 100 MG/DL    VLDL, calculated 19.6 MG/DL    CHOL/HDL Ratio 3.9 0 - 5.0     METABOLIC PANEL, COMPREHENSIVE   Result Value Ref Range    Sodium 141 136 - 145 mmol/L    Potassium 4.3 3.5 - 5.5 mmol/L    Chloride 105 100 - 108 mmol/L    CO2 30 21 - 32 mmol/L    Anion gap 6 3.0 - 18 mmol/L    Glucose 103 (H) 74 - 99 mg/dL    BUN 15 7.0 - 18 MG/DL    Creatinine 0.87 0.6 - 1.3 MG/DL    BUN/Creatinine ratio 17 12 - 20      GFR est AA >60 >60 ml/min/1.73m2    GFR est non-AA >60 >60 ml/min/1.73m2    Calcium 9.1 8.5 - 10.1 MG/DL    Bilirubin, total 0.4 0.2 - 1.0 MG/DL    ALT (SGPT) 16 13 - 56 U/L    AST (SGOT) 15 15 - 37 U/L    Alk. phosphatase 59 45 - 117 U/L    Protein, total 7.8 6.4 - 8.2 g/dL    Albumin 4.4 3.4 - 5.0 g/dL    Globulin 3.4 2.0 - 4.0 g/dL    A-G Ratio 1.3 0.8 - 1.7     TSH 3RD GENERATION   Result Value Ref Range    TSH 1.75 0.36 - 3.74 uIU/mL   URINALYSIS W/ RFLX MICROSCOPIC   Result Value Ref Range    Color DARK YELLOW      Appearance CLEAR      Specific gravity 1.027 1.005 - 1.030      pH (UA) 6.5 5.0 - 8.0      Protein NEGATIVE  NEG mg/dL    Glucose NEGATIVE  NEG mg/dL    Ketone TRACE (A) NEG mg/dL    Bilirubin NEGATIVE  NEG      Blood NEGATIVE  NEG      Urobilinogen 1.0 0.2 - 1.0 EU/dL    Nitrites NEGATIVE  NEG      Leukocyte Esterase NEGATIVE  NEG     VITAMIN D, 25 HYDROXY   Result Value Ref Range    Vitamin D 25-Hydroxy 17.6 (L) 30 - 100 ng/mL   HEMOGLOBIN A1C WITH EAG   Result Value Ref Range    Hemoglobin A1c 4.8 4.2 - 5.6 %    Est. average glucose Cannot be calculated mg/dL       TESTS  none    PE  Physical Exam   Constitutional: She is oriented to person, place, and time. She appears well-developed and well-nourished. No distress. Very pleasant, properly dressed   HENT:   Head: Normocephalic and atraumatic. Cardiovascular: Normal rate, regular rhythm, normal heart sounds and intact distal pulses. Pulmonary/Chest: Effort normal and breath sounds normal.   Abdominal: Soft. Bowel sounds are normal.   Musculoskeletal: Normal range of motion. Neurological: She is alert and oriented to person, place, and time. Skin: Skin is warm and dry. She is not diaphoretic. Psychiatric: She has a normal mood and affect.  Her behavior is normal. Judgment and thought content normal.   Calm personality   Vitals reviewed. Assessment/Plan:    1. Low back pain- Improving. Continue PT. Monitor. PRN follow up. 2. Vitamin D insufficiency- Continue OTC supplementation will recheck labs in 6 months. 3. Elevated LDL- Lifestyle changes. Recheck labs in 6 months. 4. Tachycardia- patient reports she was told 3 years ago during  of elevated pulse. Asymptomatic. EKG showed NSR, nonspecific prominent R wave in V1, possible anterior fascicular block, EKG reviewed by Dr. Blaine Cassidy. Monitor. Lab review: labs are reviewed with the patient. Today's Visit:   Diagnoses and all orders for this visit:    1. Vitamin D insufficiency    2. Acute left-sided low back pain without sciatica    Health Maintenance: Up to date. I have discussed the diagnosis with the patient and the intended plan as seen in the above orders. The patient has received an after-visit summary and questions were answered concerning future plans. I have discussed medication side effects and warnings with the patient as well. I have reviewed the plan of care with the patient, accepted their input and they are in agreement with the treatment goals. Follow-up Disposition: Not on File   More than 1/2 of this 25 minute visit was spent in counseling and coordination of care, as described above.     Elyssa Dominguez, STEPHANIA-C  Internist of [address], [facility]  Phone: [phone]  Fax: [phone]

## 2018-03-07 NOTE — MR AVS SNAPSHOT
[facility] 
 
 
 [address], [address] 
[phone] Patient: Nadia Jonas MRN: [id number] WBT:[id number] Visit Information Date & Time Provider Department Dept. Phone Encounter #  
 3/7/2018  8:00 AM Angélica No NP [facility] [address] [place] [address] 23-14-20-09 Follow-up Instructions Return in about 6 months (around 9/7/2018) for labs, heart rate. Upcoming Health Maintenance Date Due  
 PAP AKA CERVICAL CYTOLOGY 5/1/2019 DTaP/Tdap/Td series (2 - Td) 12/1/2024 Allergies as of 3/7/2018  Review Complete On: 3/7/2018 By: Andreina Sullivan Severity Noted Reaction Type Reactions Metformin  02/19/2018    Diarrhea Naproxen  02/19/2018    Other (comments) Hard Build up under skin Current Immunizations  Never Reviewed No immunizations on file. Not reviewed this visit You Were Diagnosed With   
  
 Codes Comments Vitamin D insufficiency    -  Primary ICD-10-CM: E55.9 ICD-9-CM: 268.9 Acute left-sided low back pain without sciatica     ICD-10-CM: M54.5 ICD-9-CM: 724.2 Tachycardia     ICD-10-CM: R00.0 ICD-9-CM: 818. 0 Vitals BP Pulse Temp Resp Height(growth percentile) Weight(growth percentile) 120/78 (BP 1 Location: Left arm, BP Patient Position: Sitting) (!) 103 98.7 °F (37.1 °C) (Oral) 14 5' 11\" (1.803 m) 167 lb 12.8 oz (76.1 kg) LMP SpO2 BMI Smoking Status 02/14/2018 98% 23.4 kg/m2 Never Smoker Vitals History BMI and BSA Data Body Mass Index Body Surface Area  
 23.4 kg/m 2 1.95 m 2 Preferred Pharmacy Pharmacy Name Phone [phone], [address] AT [address] [phone] Your Updated Medication List  
  
   
This list is accurate as of 3/7/18  8:51 AM.  Always use your most recent med list.  
  
  
  
  
 albuterol 90 mcg/actuation inhaler Commonly known as:  PROVENTIL HFA, VENTOLIN HFA, PROAIR HFA Take 2 Puffs by inhalation every four (4) hours as needed for Wheezing. ALEVE 220 mg tablet Generic drug:  naproxen sodium Take 220 mg by mouth two (2) times daily (with meals). inhalational spacing device 1 Each by Does Not Apply route as needed. predniSONE 10 mg dose pack Commonly known as:  STERAPRED DS See administration instruction per 10mg dose pack We Performed the Following AMB POC EKG ROUTINE W/ 12 LEADS, INTER & REP [52386 CPT(R)] Follow-up Instructions Return in about 6 months (around 9/7/2018) for labs, heart rate. To-Do List   
 03/08/2018 7:30 AM  
  Appointment with Yolanda Solorzano PTA at [facility]  [facility] ([phone])  
  
 03/13/2018 7:30 AM  
  Appointment with Naun Jorgensen PTA at [address] ([phone])  
  
 03/15/2018 7:30 AM  
  Appointment with Yolanda Solorzano PTA at [address] ([phone])  
  
 03/20/2018 7:30 AM  
  Appointment with Naun Jorgensen PTA at [address] ([phone])  
  
 03/22/2018 7:30 AM  
  Appointment with Yolanda Solorzano PTA at [facility]  [address] ([phone]) Patient Instructions Pneumococcal Polysaccharide Vaccine: Care Instructions Your Care Instructions The pneumococcal polysaccharide vaccine (PPSV) can prevent some of the serious complications of pneumonia. This includes infection in the bloodstream (bacteremia) or throughout the body (septicemia). PPSV is recommended for people ages 72 years and older. People ages 3 to 59 who have a long-term illness should also get the vaccine. This includes people with diabetes, heart disease, liver disease, or lung disease. PPSV can also help people who have a weakened immune system. This includes cancer patients and people who don't have a spleen. The immune system helps your body fight infection and other illnesses. PPSV is given as a shot. It's usually given in the arm. Healthy older adults get the shot once. Other people may need to have a second dose. The shot may cause pain and redness at the site. It may also cause a mild fever for a short time. Follow-up care is a key part of your treatment and safety. Be sure to make and go to all appointments, and call your doctor if you are having problems. It's also a good idea to know your test results and keep a list of the medicines you take. How can you care for yourself at home? · Take an over-the-counter pain medicine, such as acetaminophen (Tylenol), ibuprofen (Advil, Motrin), or naproxen (Aleve), if your arm is sore after the shot. Be safe with medicines. Read and follow all instructions on the label. · Give acetaminophen (Tylenol) or ibuprofen (Advil, Motrin) to your child for pain or fussiness after the shot. Read and follow all instructions on the label. Do not give aspirin to anyone younger than 20. It has been linked to Reye syndrome, a serious illness. · Put ice or a cold pack on the sore area for 10 to 20 minutes at a time. Put a thin cloth between the ice and your skin. When should you call for help? Call 911 anytime you think you may need emergency care. For example, call if: 
? · You have a seizure. ? · You have symptoms of a severe allergic reaction. These may include: 
¨ Sudden raised, red areas (hives) all over the body. ¨ Swelling of the throat, mouth, lips, or tongue. ¨ Trouble breathing. ¨ Passing out (losing consciousness). Or you may feel very lightheaded or suddenly feel weak, confused, or restless. ?Call your doctor now or seek immediate medical care if: 
? · You have symptoms of an allergic reaction, such as: ¨ A rash or hives (raised, red areas on the skin). ¨ Itching. ¨ Swelling. ¨ Belly pain, nausea, or vomiting. ? · You have a high fever. ? Watch closely for changes in your health, and be sure to contact your doctor if you have any problems. Where can you learn more? Go to http://jackeline-mirella.info/. Enter T225 in the search box to learn more about \"Pneumococcal Polysaccharide Vaccine: Care Instructions. \" Current as of: September 24, 2016 Content Version: 11.4 © 5226-0827 [employer]. Care instructions adapted under license by [employer] (which disclaims liability or warranty for this information). If you have questions about a medical condition or this instruction, always ask your healthcare professional. [place] [address] any warranty or liability for your use of this information. Pneumococcal Polyvalent Vaccine (Pneumovax 23) - (By injection) Why this medicine is used:  
Prevents severe infections, such as pneumonia and meningitis. Contact a nurse or doctor right away if you have: 
· Itching or hives, swelling in your face or hands, swelling or tingling in your mouth or throat, chest tightness, trouble breathing · High fever Common side effects: 
· Headache · Muscle or joint pain · Pain, redness, swelling, or tenderness where the shot was given · Tiredness or weakness © 2017 [address] Information is for End User's use only and may not be sold, redistributed or otherwise used for commercial purposes. Introducing [facility]! [address] introduces [employer] patient portal. Now you can access parts of your medical record, email your doctor's office, and request medication refills online. 1. In your internet browser, go to [email or web address] 2. Click on the First Time User? Click Here link in the Sign In box. You will see the New Member Sign Up page. 3. Enter your [employer] Access Code exactly as it appears below. You will not need to use this code after youve completed the sign-up process. If you do not sign up before the expiration date, you must request a new code. · [employer] Access Code: [email or web address] Expires: 5/20/2018  1:13 PM 
 
4. Enter the last four digits of your Social Security Number (xxxx) and Date of Birth (mm/dd/yyyy) as indicated and click Submit. You will be taken to the next sign-up page. 5. Create a [employer] ID. This will be your [employer] login ID and cannot be changed, so think of one that is secure and easy to remember. 6. Create a [employer] password. You can change your password at any time. 7. Enter your Password Reset Question and Answer. This can be used at a later time if you forget your password. 8. Enter your e-mail address. You will receive e-mail notification when new information is available in [address] 9. Click Sign Up. You can now view and download portions of your medical record. 10. Click the Download Summary menu link to download a portable copy of your medical information. If you have questions, please visit the Frequently Asked Questions section of the [employer] website. Remember, [employer] is NOT to be used for urgent needs. For medical emergencies, dial 911. Now available from your iPhone and Android! Please provide this summary of care documentation to your next provider. Your primary care clinician is listed as Ina Ballesteros. If you have any questions after today's visit, please call [phone].

## 2018-03-07 NOTE — PATIENT INSTRUCTIONS
Pneumococcal Polysaccharide Vaccine: Care Instructions  Your Care Instructions    The pneumococcal polysaccharide vaccine (PPSV) can prevent some of the serious complications of pneumonia. This includes infection in the bloodstream (bacteremia) or throughout the body (septicemia). PPSV is recommended for people ages 72 years and older. People ages 3 to 59 who have a long-term illness should also get the vaccine. This includes people with diabetes, heart disease, liver disease, or lung disease. PPSV can also help people who have a weakened immune system. This includes cancer patients and people who don't have a spleen. The immune system helps your body fight infection and other illnesses. PPSV is given as a shot. It's usually given in the arm. Healthy older adults get the shot once. Other people may need to have a second dose. The shot may cause pain and redness at the site. It may also cause a mild fever for a short time. Follow-up care is a key part of your treatment and safety. Be sure to make and go to all appointments, and call your doctor if you are having problems. It's also a good idea to know your test results and keep a list of the medicines you take. How can you care for yourself at home? · Take an over-the-counter pain medicine, such as acetaminophen (Tylenol), ibuprofen (Advil, Motrin), or naproxen (Aleve), if your arm is sore after the shot. Be safe with medicines. Read and follow all instructions on the label. · Give acetaminophen (Tylenol) or ibuprofen (Advil, Motrin) to your child for pain or fussiness after the shot. Read and follow all instructions on the label. Do not give aspirin to anyone younger than 20. It has been linked to Reye syndrome, a serious illness. · Put ice or a cold pack on the sore area for 10 to 20 minutes at a time. Put a thin cloth between the ice and your skin. When should you call for help? Call 911 anytime you think you may need emergency care.  For example, call if:  ? · You have a seizure. ? · You have symptoms of a severe allergic reaction. These may include:  ¨ Sudden raised, red areas (hives) all over the body. ¨ Swelling of the throat, mouth, lips, or tongue. ¨ Trouble breathing. ¨ Passing out (losing consciousness). Or you may feel very lightheaded or suddenly feel weak, confused, or restless. ?Call your doctor now or seek immediate medical care if:  ? · You have symptoms of an allergic reaction, such as:  ¨ A rash or hives (raised, red areas on the skin). ¨ Itching. ¨ Swelling. ¨ Belly pain, nausea, or vomiting. ? · You have a high fever. ? Watch closely for changes in your health, and be sure to contact your doctor if you have any problems. Where can you learn more? Go to http://jackelineVirtual View Appmirella.info/. Enter T225 in the search box to learn more about \"Pneumococcal Polysaccharide Vaccine: Care Instructions. \"  Current as of: September 24, 2016  Content Version: 11.4  © 9581-1771 [employer]. Care instructions adapted under license by [employer] (which disclaims liability or warranty for this information). If you have questions about a medical condition or this instruction, always ask your healthcare professional. [place] [address] any warranty or liability for your use of this information. Pneumococcal Polyvalent Vaccine (Pneumovax 23) - (By injection)   Why this medicine is used:   Prevents severe infections, such as pneumonia and meningitis.   Contact a nurse or doctor right away if you have:  · Itching or hives, swelling in your face or hands, swelling or tingling in your mouth or throat, chest tightness, trouble breathing  · High fever     Common side effects:  · Headache  · Muscle or joint pain  · Pain, redness, swelling, or tenderness where the shot was given  · Tiredness or weakness  © 2017 [address] Information is for End User's use only and may not be sold, redistributed or otherwise used for commercial purposes.

## 2018-03-08 ENCOUNTER — HOSPITAL ENCOUNTER (OUTPATIENT)
Dept: PHYSICAL THERAPY | Age: 33
Discharge: HOME | End: 2018-03-08
Payer: COMMERCIAL

## 2018-03-08 PROCEDURE — 97112 NEUROMUSCULAR REEDUCATION: CPT

## 2018-03-08 PROCEDURE — 97110 THERAPEUTIC EXERCISES: CPT

## 2018-03-08 NOTE — PROGRESS NOTES
PT DAILY TREATMENT NOTE     Patient Name: Xu Archer Lodge  Date:3/8/2018  : 1985  [x]  Patient  Verified  Payor: BLUE CROSS / Plan: [facility] PPO / Product Type: PPO /    In time:7:38  Out time:8:17  Total Treatment Time (min): 39  Visit #: 4 of 8    Treatment Area: Low back pain [M54.5]    SUBJECTIVE  Pain Level (0-10 scale): 0/10  Any medication changes, allergies to medications, adverse drug reactions, diagnosis change, or new procedure performed?: [x] No    [] Yes (see summary sheet for update)  Subjective functional status/changes:   [] No changes reported  \"Been feeling better. Starts to hurt in the afternoon. \"    OBJECTIVE    29 min Therapeutic Exercise:  [x] See flow sheet :   Rationale: increase ROM and increase strength to improve the patients ability to perform ADL's. 10 min Neuromuscular Re-education:  [x]  See flow sheet : Slight (R) SI upslip, did not correct. Rationale: increase strength and increase proprioception  to improve the patients ability to perform functional activities. With   [x] TE   [] TA   [] neuro   [] other: Patient Education: [x] Review HEP    [] Progressed/Changed HEP based on:   [] positioning   [] body mechanics   [] transfers   [] heat/ice application    [] other:      Other Objective/Functional Measures: Able to correct rib flare with TA recruitment after extensive cueing. Pain Level (0-10 scale) post treatment: 0/10    ASSESSMENT/Changes in Function: Pt reports some pinching on the lower left side when walking long distances. Compensates during QP exercises with T/S flexion. Patient will continue to benefit from skilled PT services to modify and progress therapeutic interventions, address functional mobility deficits, address ROM deficits, address strength deficits, analyze and cue movement patterns and analyze and modify body mechanics/ergonomics to attain remaining goals.      [x]  See Plan of Care  []  See progress note/recertification  []  See Discharge Summary         Progress towards goals / Updated goals:   Short Term Goals: To be accomplished in 2 weeks:                         8. The patient will be independent and compliant with HEP to maximize therapeutic benefit. - performing 1x/day 3-1-18                         2. The patient will demonstrate SIJ neutral to improve ease of lifting tasks. - slight up-sliip 3-1-18  Long Term Goals: To be accomplished in 4 weeks:                         1. The patient will improve FOTO score to 72 to improve quality of life.                         2. The patient will improve hip ABD/EXT to 4/5 MMT to maximize stability in stance.                         3. The patient will demonstrate 20# KB squat x 10 void of pain to return to PLOF.                        8. The patient will report no difficulty walking for 1 hour in order to maximize ADL ease. - Pt reports some pinching on the lower left side when walking long distances.  3/8/2018     PLAN  []  Upgrade activities as tolerated     [x]  Continue plan of care  []  Update interventions per flow sheet       []  Discharge due to:_  []  Other:_      Bereniceovi Arizmendi, PTA 3/8/2018  7:47 AM    Future Appointments  Date Time Provider Aminta Blank   3/13/2018 7:30 AM [place] Postal, PTA [facility]   3/15/2018 7:30 AM Bereniceovi Mcgarrys, PTA [facility]   3/20/2018 7:30 AM Luz Postal, PTA [facility]   3/22/2018 7:30 AM Berenice Pippins, PTA [facility]

## 2018-03-13 ENCOUNTER — APPOINTMENT (OUTPATIENT)
Dept: PHYSICAL THERAPY | Age: 33
End: 2018-03-13
Payer: COMMERCIAL

## 2018-03-15 ENCOUNTER — HOSPITAL ENCOUNTER (OUTPATIENT)
Dept: PHYSICAL THERAPY | Age: 33
Discharge: HOME | End: 2018-03-15
Payer: COMMERCIAL

## 2018-03-15 PROCEDURE — 97112 NEUROMUSCULAR REEDUCATION: CPT

## 2018-03-15 PROCEDURE — 97110 THERAPEUTIC EXERCISES: CPT

## 2018-03-15 NOTE — PROGRESS NOTES
PT DAILY TREATMENT NOTE     Patient Name: Mitchell Kelsey  Date:3/15/2018  : 1985  [x]  Patient  Verified  Payor: BLUE DELVIN / Plan: [facility] [id number] PPO / Product Type: PPO /    In time:8:10  Out time:8:40  Total Treatment Time (min): 30  Visit #: 5 of 8    Treatment Area: Low back pain [M54.5]    SUBJECTIVE  Pain Level (0-10 scale): 1/10  Any medication changes, allergies to medications, adverse drug reactions, diagnosis change, or new procedure performed?: [x] No    [] Yes (see summary sheet for update)  Subjective functional status/changes:   [] No changes reported  \"Had a 3/10 pain over the weekend, it has decreased since then. Had some pain in my right low back which was inconsistent with what I was feeling. \"    OBJECTIVE    22 min Therapeutic Exercise:  [x] See flow sheet :   Rationale: increase ROM and increase strength to improve the patients ability to perform ADL's.    8 min Neuromuscular Re-education:  [x]  See flow sheet : (L) LE distraction to correct (L) SI upslip. Shotgun technique. Rationale: increase strength and increase proprioception  to improve the patients ability to perform functional activities. With   [x] TE   [] TA   [] neuro   [] other: Patient Education: [x] Review HEP    [] Progressed/Changed HEP based on:   [] positioning   [] body mechanics   [] transfers   [] heat/ice application    [] other:      Other Objective/Functional Measures: Added 12# KB to squats, occasional cueing to correct mechanics. Pain Level (0-10 scale) post treatment: 1/10    ASSESSMENT/Changes in Function: Pt has difficulty maintaining TA while performing exercises. Pt reports limited HEP compliance. FOTO 60%.     Patient will continue to benefit from skilled PT services to modify and progress therapeutic interventions, address functional mobility deficits, address ROM deficits, address strength deficits, analyze and cue movement patterns and analyze and modify body mechanics/ergonomics to attain remaining goals. [x]  See Plan of Care  []  See progress note/recertification  []  See Discharge Summary         Progress towards goals / Updated goals:   Short Term Goals: To be accomplished in 2 weeks:                         5. The patient will be independent and compliant with HEP to maximize therapeutic benefit. - performing 1x/day 3-1-18; Limited compliance per pt. 3/15/2018                         2. The patient will demonstrate SIJ neutral to improve ease of lifting tasks. - slight up-sliip 3-1-18  Long Term Goals: To be accomplished in 4 weeks:                         1. The patient will improve FOTO score to 72 to improve quality of life. - FOTO 60%. 3/15/2018                         2. The patient will improve hip ABD/EXT to 4/5 MMT to maximize stability in stance.                         3. The patient will demonstrate 20# KB squat x 10 void of pain to return to PLOF.                        5. The patient will report no difficulty walking for 1 hour in order to maximize ADL ease. - Pt reports some pinching on the lower left side when walking long distances.  3/8/2018    PLAN  []  Upgrade activities as tolerated     [x]  Continue plan of care  []  Update interventions per flow sheet       []  Discharge due to:_  []  Other:_      Kimmy Martel PTA 3/15/2018  8:16 AM    Future Appointments  Date Time Provider Aminta Blank   3/20/2018 7:30 AM Francoise Morfin PTA [facility]   3/22/2018 7:30 [facility]   3/27/2018 7:00 AM [facility] Ling[facility]

## 2018-03-20 ENCOUNTER — APPOINTMENT (OUTPATIENT)
Dept: PHYSICAL THERAPY | Age: 33
End: 2018-03-20
Payer: COMMERCIAL

## 2018-03-22 ENCOUNTER — HOSPITAL ENCOUNTER (OUTPATIENT)
Dept: PHYSICAL THERAPY | Age: 33
Discharge: HOME | End: 2018-03-22
Payer: COMMERCIAL

## 2018-03-22 PROCEDURE — 97112 NEUROMUSCULAR REEDUCATION: CPT

## 2018-03-22 PROCEDURE — 97110 THERAPEUTIC EXERCISES: CPT

## 2018-03-22 NOTE — PROGRESS NOTES
PT DAILY TREATMENT NOTE     Patient Name: Kiah He  Date:3/22/2018  : 1985  [x]  Patient  Verified  Payor: BLUE CROSS / Plan: [facility] PPO / Product Type: PPO /    In time:7:33  Out time:8:08  Total Treatment Time (min): 35  Visit #: 6 of 8    Treatment Area: Low back pain [M54.5]    SUBJECTIVE  Pain Level (0-10 scale): 0/10  Any medication changes, allergies to medications, adverse drug reactions, diagnosis change, or new procedure performed?: [x] No    [] Yes (see summary sheet for update)  Subjective functional status/changes:   [] No changes reported  \"Had a little pinch sensation on the left side of my back while I was driving a total of 4 hours to  my son. I found out Tuesday that I'm 5-6 weeks pregnant. \"    OBJECTIVE    25 min Therapeutic Exercise:  [x] See flow sheet :   Rationale: increase ROM and increase strength to improve the patients ability to perform ADL's. 10 min Neuromuscular Re-education:  [x]  See flow sheet :   Rationale: increase strength and increase proprioception  to improve the patients ability to perform functional activities. With   [x] TE   [] TA   [] neuro   [] other: Patient Education: [x] Review HEP    [] Progressed/Changed HEP based on:   [] positioning   [] body mechanics   [] transfers   [] heat/ice application    [] other:      Other Objective/Functional Measures: Level SI alignment. Pain Level (0-10 scale) post treatment: 0/10    ASSESSMENT/Changes in Function: Continues to require cueing to correct TA recruitment with bridges and QP exercises. Able to correct 50% of the time. Patient will continue to benefit from skilled PT services to modify and progress therapeutic interventions, address functional mobility deficits, address ROM deficits, address strength deficits, analyze and cue movement patterns and analyze and modify body mechanics/ergonomics to attain remaining goals.      [x]  See Plan of Care  []  See progress note/recertification  []  See Discharge Summary         Progress towards goals / Updated goals:  Short Term Goals: To be accomplished in 2 weeks:                         3. The patient will be independent and compliant with HEP to maximize therapeutic benefit. - performing 1x/day 3-1-18; Limited compliance per pt. 3/15/2018                         2. The patient will demonstrate SIJ neutral to improve ease of lifting tasks. - slight up-sliip 3-1-18  Long Term Goals: To be accomplished in 4 weeks:                         1. The patient will improve FOTO score to 72 to improve quality of life. - FOTO 60%. 3/15/2018                         2. The patient will improve hip ABD/EXT to 4/5 MMT to maximize stability in stance.                         3. The patient will demonstrate 20# KB squat x 10 void of pain to return to PLOF. - 12# without provocation of pain. 3/22/2018                         4. The patient will report no difficulty walking for 1 hour in order to maximize ADL ease. - Pt reports some pinching on the lower left side when walking long distances.  3/8/2018    PLAN  []  Upgrade activities as tolerated     [x]  Continue plan of care  []  Update interventions per flow sheet       []  Discharge due to:_  []  Other:_      Yumiko Roche PTA 3/22/2018  7:35 AM    Future Appointments  Date Time Provider Aminta Blank   3/27/2018 7:00 AM Maame Edmonds PT [facility]   3/29/2018 7:30 [facility]

## 2018-03-29 ENCOUNTER — HOSPITAL ENCOUNTER (OUTPATIENT)
Dept: PHYSICAL THERAPY | Age: 33
Discharge: HOME | End: 2018-03-29
Payer: COMMERCIAL

## 2018-03-29 PROCEDURE — 97112 NEUROMUSCULAR REEDUCATION: CPT

## 2018-03-29 PROCEDURE — 97110 THERAPEUTIC EXERCISES: CPT

## 2018-03-29 NOTE — PROGRESS NOTES
PT DAILY TREATMENT NOTE     Patient Name: Lou Torres  Date:3/29/2018  : 1985  [x]  Patient  Verified  Payor: BLUE CROSS / Plan: Terrence Morales [id number] PPO / Product Type: PPO /    In time:7:36  Out time:8:13  Total Treatment Time (min): 37  Visit #: 7 of 8    Treatment Area: Low back pain [M54.5]    SUBJECTIVE  Pain Level (0-10 scale): 0/10  Any medication changes, allergies to medications, adverse drug reactions, diagnosis change, or new procedure performed?: [x] No    [] Yes (see summary sheet for update)  Subjective functional status/changes:   [] No changes reported  \"Had a pinch in my low back over the weekend, didn't last long. \"    OBJECTIVE    25 min Therapeutic Exercise:  [x] See flow sheet :   Rationale: increase ROM and increase strength to improve the patients ability to perform ADL's.    12 min Neuromuscular Re-education:  [x]  See flow sheet :   Rationale: increase strength and increase proprioception  to improve the patients ability to perform functional activities. With   [x] TE   [] TA   [] neuro   [] other: Patient Education: [x] Review HEP    [] Progressed/Changed HEP based on:   [] positioning   [] body mechanics   [] transfers   [] heat/ice application    [] other:      Other Objective/Functional Measures: Added (B) and (U) Farm carries with 15# resistance. MMT Hip abd Left 4-/5, Right 4/5, Hip ext (B) 4/5. Pain Level (0-10 scale) post treatment: 0/10    ASSESSMENT/Changes in Function: Difficulty stabilizing trunk to maintain erect posture with (U) farm carries and proper mechanics with QP activities. Patient will continue to benefit from skilled PT services to modify and progress therapeutic interventions, address functional mobility deficits, address ROM deficits, address strength deficits and analyze and modify body mechanics/ergonomics to attain remaining goals.      []  See Plan of Care  [x]  See progress note/recertification  []  See Discharge Summary Progress towards goals / Updated goals:  Short Term Goals: To be accomplished in 2 weeks:                         1. The patient will be independent and compliant with HEP to maximize therapeutic benefit. - performing 1x/day 3-1-18; Limited compliance per pt. 3/15/2018                         2. The patient will demonstrate SIJ neutral to improve ease of lifting tasks. - slight up-sliip 3-1-18; Level 2 consecutive visits. 3/29/2018  Long Term Goals: To be accomplished in 4 weeks:                         3. The patient will improve FOTO score to 72 to improve quality of life. - FOTO 60%. 3/15/2018                         2. The patient will improve hip ABD/EXT to 4/5 MMT to maximize stability in stance. - Improved to MMT Hip abd Left 4-/5, Right 4/5, Hip ext (B) 4/5. 3/29/2018                         3. The patient will demonstrate 20# KB squat x 10 void of pain to return to PLOF. - 12# without provocation of pain. 3/22/2018                         4. The patient will report no difficulty walking for 1 hour in order to maximize ADL ease. - Pt reports some pinching on the lower left side when walking long distances. 3/8/2018    PLAN  []  Upgrade activities as tolerated     [x]  Continue plan of care  []  Update interventions per flow sheet        []  Discharge due to:_  []  Other:_      Bang Fried, KENNEY 3/29/2018  7:40 AM    No future appointments.

## 2018-03-30 ENCOUNTER — HOSPITAL ENCOUNTER (OUTPATIENT)
Dept: PHYSICAL THERAPY | Age: 33
Discharge: HOME | End: 2018-03-30
Payer: COMMERCIAL

## 2018-03-30 PROCEDURE — 97110 THERAPEUTIC EXERCISES: CPT

## 2018-03-30 PROCEDURE — 97112 NEUROMUSCULAR REEDUCATION: CPT

## 2018-03-30 NOTE — PROGRESS NOTES
PT DAILY TREATMENT NOTE 1216    Patient Name: Unruly Hickey  Date:3/30/2018  : 1985  [x]  Patient  Verified  Payor: BLUE CROSS / Plan: [facility] PPO / Product Type: PPO /    In time:11:00  Out time:11:36  Total Treatment Time (min): 36  Visit #: 8 of 8    Treatment Area: Low back pain [M54.5]    SUBJECTIVE  Pain Level (0-10 scale): 0/10  Any medication changes, allergies to medications, adverse drug reactions, diagnosis change, or new procedure performed?: [x] No    [] Yes (see summary sheet for update)  Subjective functional status/changes:   [] No changes reported  Pt denies any pain. Pt reports performing HEP. OBJECTIVE    26 min Therapeutic Exercise:  [x] See flow sheet :   Rationale: increase ROM and increase strength to improve the patients ability to tolerate ADLs. 10 min Neuromuscular Re-education:  [x]  See flow sheet :   Rationale: increase strength, improve coordination and increase proprioception  to improve the patients ability to perform functional activities. With   [] TE   [] TA   [] neuro   [] other: Patient Education: [x] Review HEP    [] Progressed/Changed HEP based on:   [] positioning   [] body mechanics   [] transfers   [] heat/ice application    [] other:      Other Objective/Functional Measures: good form and no increased pain with 20# kettle bell squats. Neutral pelvic alignment. Pain Level (0-10 scale) post treatment: 0/10    ASSESSMENT/Changes in Function: Pt has made significant progress or met all functional goals. Issued updated HEP; DC'd to updated HEP. []  See Plan of Care  []  See progress note/recertification  [x]  See Discharge Summary         Progress towards goals / Updated goals:  Short Term Goals: To be accomplished in 2 weeks:                         9. The patient will be independent and compliant with HEP to maximize therapeutic benefit. - performing 1x/day 3-18; Limited compliance per pt. 3/15/2018                         2. The patient will demonstrate SIJ neutral to improve ease of lifting tasks. - slight up-sliip 3-1-18; Level 2 consecutive visits. 3/29/2018  Long Term Goals: To be accomplished in 4 weeks:                         9. The patient will improve FOTO score to 72 to improve quality of life. - FOTO 60%. 3/15/2018                          2. The patient will improve hip ABD/EXT to 4/5 MMT to maximize stability in stance. - Improved to MMT Hip abd Left 4-/5, Right 4/5, Hip ext (B) 4/5. 3/29/2018                         3. The patient will demonstrate 20# KB squat x 10 void of pain to return to PLOF. - Met no increased pain. 3/30/18                         4. The patient will report no difficulty walking for 1 hour in order to maximize ADL ease. - Met per patient report. 3/30/18    PLAN  []  Upgrade activities as tolerated     []  Continue plan of care  []  Update interventions per flow sheet       [x]  Discharge due to:_  []  Other:_      Maciel Kumar, PTA 3/30/2018  11:22 AM    No future appointments.

## 2018-04-03 ENCOUNTER — TELEPHONE (OUTPATIENT)
Dept: INTERNAL MEDICINE CLINIC | Age: 33
End: 2018-04-03

## 2018-04-03 NOTE — TELEPHONE ENCOUNTER
Pt called and stated tht she has finished the physical therapy tht SL recommended she take, she finished on last Friday, she said since then she has experienced swelling on her left side, she wants to have the therapy extended but was told she needs to get another order, adv her SL out of the office, she wants to know if anyone else can give orders?

## 2018-04-05 NOTE — TELEPHONE ENCOUNTER
Can you please triage. Previous workup noted pain only with movement, no muscle spasm. Is the swelling noted to same left hip/back region as noted with initial complaint or changed. Any swelling to leg, heat, redness? Any complaints of change in bowel or bladder,  blood in urine, pain to flank with radiation to groin? Please schedule her to see me unless felt emergent.

## 2018-04-05 NOTE — TELEPHONE ENCOUNTER
Pt called again today, she needs to spk to a nurse to discuss message from [facility] ([facility])

## 2018-04-09 NOTE — TELEPHONE ENCOUNTER
Called and spoke to patient and patient stated that she is having the same issue with swelling in left hip/back again. Patient denies any swelling to leg, heat or redness or any changes in bowel or bladder. Patient stated that she completed the PT and they gave her the bands and excerces to do and she feels that she is uneven again. Patient stated that just needs to be snapped back into place and doesn't want to go back to PT again. Patient wants to know what she should do.

## 2018-04-10 NOTE — TELEPHONE ENCOUNTER
Following up with Mrs. Fox Urias regarding previous messages of pain and swelling over left hip region. Mrs. Fox Urias reports was feeling that symptoms of lower back pain had resolved after treatment with Prednisone and PT. She reports PT was adjusting/realigning the left leg and she reports improvement with that realignment. The patient reports history of intermitting swelling over the left hip superior to the hip region over last 4 years and was told in the past that the swelling was over the bowel and thought to be related to change in bowel. She traveled over the weekend to [facility], a 2 h drive. She was the passenger. Does have a 1year old. She reports the pain level today has improved and denies redness, swelling today. Denies trauma although does have a 1year old that she picks up. She also reports she is in her 1st trimester of pregnancy. She declines PT at this time. Recommended Tylenol for discomfort, heating pad, and use body pillow between legs for alignment while sleeping. She is following with GYN for pregnancy. Instructed to monitor for dysuria, fever, chills, redness, increased swelling. Thought at this time to be ligamentous laxity to left hip joint that may be exacerbated by pregnancy. May have also re- injured previous injury. Do to improvement in symptoms, the patient and I agree to monitor and she will report symptoms. If symptoms persist, worsen then will refer to Orthopedics for further assessment.

## 2018-04-19 ENCOUNTER — TELEPHONE (OUTPATIENT)
Dept: INTERNAL MEDICINE CLINIC | Age: 33
End: 2018-04-19

## 2018-04-19 PROBLEM — Z34.90 PREGNANCY: Status: ACTIVE | Noted: 2018-04-19

## 2018-06-20 NOTE — PROGRESS NOTES
[facility]  [address]  [place], [address]  [phone] fax    Physical Therapy Discharge Summary  Patient name: Cezar Bennett Start of Care: 2018   Referral source: Wilmer Barron MD : 1985                          Medical Diagnosis: Low back pain [M54.5] Onset Date:2018                          Treatment Diagnosis: LBP/SIJ dysfunction   Prior Hospitalization: see medical history Provider#: [id number]   Medications: Verified on Patient summary List    Comorbidities: Scoliosis, Asthma, C section   Prior Level of Function: No difficulty lifting or transporting items in vehicle. Reports improved ease of caring for 2 y.o. prior to onset. Visits from Start of Care: 8    Missed Visits: 2  Reporting Period : 2018 to 3/30/2018    Summary of Care:  Short Term Goals: To be accomplished in 2 weeks:                         1. The patient will be independent and compliant with HEP to maximize therapeutic benefit. - performing 1x/day 3-1-18; Limited compliance per pt. 3/15/2018                         2. The patient will demonstrate SIJ neutral to improve ease of lifting tasks. - slight up-sliip 3-1-18; Level 2 consecutive visits. 3/29/2018  Long Term Goals: To be accomplished in 4 weeks:                         0. The patient will improve FOTO score to 72 to improve quality of life. - FOTO 60%. 3/15/2018                          2. The patient will improve hip ABD/EXT to 4/5 MMT to maximize stability in stance. - Improved to MMT Hip abd Left 4-/5, Right 4/5, Hip ext (B) 4/5. 3/29/2018                         3. The patient will demonstrate 20# KB squat x 10 void of pain to return to PLOF. - Met no increased pain. 3/30/18                         4. The patient will report no difficulty walking for 1 hour in order to maximize ADL ease. - Met per patient report.  3/30/18       ASSESSMENT/RECOMMENDATIONS:  Pt has made significant progress or met all functional goals. Issued updated HEP; DC'd to updated HEP.      [x]Discontinue therapy: [x]Patient has reached or is progressing toward set goals      []Patient is non-compliant or has abdicated      []Due to lack of appreciable progress towards set [address], PT 6/20/2018 2:20 PM

## 2021-04-20 ENCOUNTER — TELEPHONE (OUTPATIENT)
Dept: INTERNAL MEDICINE CLINIC | Age: 36
End: 2021-04-20

## 2021-04-20 NOTE — TELEPHONE ENCOUNTER
Pt was a former pt of dede schumacher  In 2018- she would like to est care asking if she can be a new pt with AN . Will you see her ?

## 2021-04-28 ENCOUNTER — OFFICE VISIT (OUTPATIENT)
Dept: INTERNAL MEDICINE CLINIC | Age: 36
End: 2021-04-28
Payer: COMMERCIAL

## 2021-04-28 ENCOUNTER — HOSPITAL ENCOUNTER (OUTPATIENT)
Dept: LAB | Age: 36
Discharge: HOME | End: 2021-04-28
Payer: COMMERCIAL

## 2021-04-28 VITALS
HEART RATE: 109 BPM | RESPIRATION RATE: 18 BRPM | TEMPERATURE: 96.4 F | SYSTOLIC BLOOD PRESSURE: 111 MMHG | WEIGHT: 197 LBS | BODY MASS INDEX: 28.2 KG/M2 | DIASTOLIC BLOOD PRESSURE: 69 MMHG | HEIGHT: 70 IN | OXYGEN SATURATION: 99 %

## 2021-04-28 DIAGNOSIS — E55.9 VITAMIN D DEFICIENCY: ICD-10-CM

## 2021-04-28 DIAGNOSIS — E66.9 OBESE BODY HABITUS: ICD-10-CM

## 2021-04-28 DIAGNOSIS — Z76.89 ENCOUNTER TO ESTABLISH CARE: Primary | ICD-10-CM

## 2021-04-28 DIAGNOSIS — Z28.9 COVID-19 VACCINATION NOT DONE: ICD-10-CM

## 2021-04-28 DIAGNOSIS — E78.5 DYSLIPIDEMIA: ICD-10-CM

## 2021-04-28 DIAGNOSIS — Z86.32 HX GESTATIONAL DIABETES: ICD-10-CM

## 2021-04-28 DIAGNOSIS — E56.9 VITAMIN DEFICIENCY: ICD-10-CM

## 2021-04-28 DIAGNOSIS — J30.89 ENVIRONMENTAL AND SEASONAL ALLERGIES: ICD-10-CM

## 2021-04-28 DIAGNOSIS — J45.20 MILD INTERMITTENT ASTHMA WITHOUT COMPLICATION: ICD-10-CM

## 2021-04-28 PROBLEM — E78.00 ELEVATED LDL CHOLESTEROL LEVEL: Status: RESOLVED | Noted: 2018-03-07 | Resolved: 2021-04-28

## 2021-04-28 PROBLEM — E28.2 PCOS (POLYCYSTIC OVARIAN SYNDROME): Status: RESOLVED | Noted: 2018-02-19 | Resolved: 2021-04-28

## 2021-04-28 PROBLEM — L68.0 HIRSUTISM: Status: RESOLVED | Noted: 2018-02-27 | Resolved: 2021-04-28

## 2021-04-28 PROBLEM — Z34.90 PREGNANCY: Status: RESOLVED | Noted: 2018-04-19 | Resolved: 2021-04-28

## 2021-04-28 PROBLEM — Z87.42 HISTORY OF ABNORMAL CERVICAL PAP SMEAR: Status: RESOLVED | Noted: 2018-02-19 | Resolved: 2021-04-28

## 2021-04-28 LAB
ALBUMIN SERPL-MCNC: 4.1 G/DL (ref 3.4–5)
ALBUMIN/GLOB SERPL: 1.3 {RATIO} (ref 0.8–1.7)
ALP SERPL-CCNC: 59 U/L (ref 45–117)
ALT SERPL-CCNC: 17 U/L (ref 13–56)
AMPHET UR QL SCN: NEGATIVE
ANION GAP SERPL CALC-SCNC: 5 MMOL/L (ref 3–18)
AST SERPL-CCNC: 12 U/L (ref 10–38)
BARBITURATES UR QL SCN: NEGATIVE
BENZODIAZ UR QL: NEGATIVE
BILIRUB SERPL-MCNC: 0.6 MG/DL (ref 0.2–1)
BUN SERPL-MCNC: 14 MG/DL (ref 7–18)
BUN/CREAT SERPL: 16 (ref 12–20)
CALCIUM SERPL-MCNC: 9 MG/DL (ref 8.5–10.1)
CANNABINOIDS UR QL SCN: NEGATIVE
CHLORIDE SERPL-SCNC: 106 MMOL/L (ref 100–111)
CHOLEST SERPL-MCNC: 209 MG/DL
CO2 SERPL-SCNC: 28 MMOL/L (ref 21–32)
COCAINE UR QL SCN: NEGATIVE
CREAT SERPL-MCNC: 0.86 MG/DL (ref 0.6–1.3)
EST. AVERAGE GLUCOSE BLD GHB EST-MCNC: 88 MG/DL
GLOBULIN SER CALC-MCNC: 3.2 G/DL (ref 2–4)
GLUCOSE SERPL-MCNC: 105 MG/DL (ref 74–99)
HBA1C MFR BLD: 4.7 % (ref 4.2–5.6)
HDLC SERPL-MCNC: 49 MG/DL (ref 40–60)
HDLC SERPL: 4.3 {RATIO} (ref 0–5)
HDSCOM,HDSCOM: NORMAL
LDLC SERPL CALC-MCNC: 143.2 MG/DL (ref 0–100)
LIPID PROFILE,FLP: ABNORMAL
METHADONE UR QL: NEGATIVE
OPIATES UR QL: NEGATIVE
PCP UR QL: NEGATIVE
POTASSIUM SERPL-SCNC: 4.1 MMOL/L (ref 3.5–5.5)
PROT SERPL-MCNC: 7.3 G/DL (ref 6.4–8.2)
SODIUM SERPL-SCNC: 139 MMOL/L (ref 136–145)
TRIGL SERPL-MCNC: 84 MG/DL (ref ?–150)
VLDLC SERPL CALC-MCNC: 16.8 MG/DL

## 2021-04-28 PROCEDURE — 82306 VITAMIN D 25 HYDROXY: CPT

## 2021-04-28 PROCEDURE — 99205 OFFICE O/P NEW HI 60 MIN: CPT | Performed by: NURSE PRACTITIONER

## 2021-04-28 PROCEDURE — 80307 DRUG TEST PRSMV CHEM ANLYZR: CPT

## 2021-04-28 PROCEDURE — 80061 LIPID PANEL: CPT

## 2021-04-28 PROCEDURE — 83036 HEMOGLOBIN GLYCOSYLATED A1C: CPT

## 2021-04-28 PROCEDURE — 80053 COMPREHEN METABOLIC PANEL: CPT

## 2021-04-28 NOTE — PROGRESS NOTES
[facility] [id number] [facility], [address]  [phone] [phone] fax    2021    HPI:   Bean Doll 1985 is a pleasant BLACK/ femalewho presents today to establish care. She is from [place], [place]. She currently works as a  at [employer]. She is  with 2 sons. PMH: Gestational diabetes, elevated LDL levels, obese body habitus, asthma, seasonal allergies, PCOS, HPV with Leep procedure . Main concern today: Obesity, DM, and cholesterol level. -Obesity: having great difficulty losing weight since having her last child in 2018.   -Gestational DM: Developed in 2018 with last child.  -Cholesterol: Was informed 2018 of elevated levels. Was unable to start treatment due to becoming pregnant.       -Seasonal allergies: During certain times of the year she will take Zyrtec as needed. -Mild intermittent asthma: Not currently under treatment for Asthma. In the past she used albuterol but has not required this therapy in years.      -History of PCOS: failing Metformin, Actos, and Spironolactone. Not on therapy at this time. Past Medical History:   Diagnosis Date    Dyslipidemia 2021    Environmental and seasonal allergies 2021    Hirsutism 2018    History of abnormal cervical Pap smear 2018    Human papilloma virus 2010    Hx gestational diabetes 2021    Mild intermittent asthma 2018    Obese body habitus 2021    PCOS (polycystic ovarian syndrome)     Tachycardia 3/7/2018    Vitamin D insufficiency 3/7/2018     Past Surgical History:   Procedure Laterality Date    HX GYN  2015        HX WISDOM TEETH EXTRACTION       Current Outpatient Medications   Medication Sig    ergocalciferol (ERGOCALCIFEROL) 1,250 mcg (50,000 unit) capsule Take 1 Cap by mouth every seven (7) days.  Indications: vitamin D deficiency (high dose therapy)    phentermine (ADIPEX-P) 37.5 mg tablet Take 1 Tab by mouth every morning. Max Daily Amount: 37.5 mg. Indications: weight loss management for overweight person with bmi 27 to 29 and weight-related comorbidity    Cetirizine (ZYRTEC) 10 mg cap Take  by mouth. No current facility-administered medications for this visit. Allergies and Intolerances: Allergies   Allergen Reactions    Metformin Diarrhea    Naproxen Other (comments)     Hard Build up under skin     Family History:   Family History   Problem Relation Age of Onset    Osteoporosis Mother     Heart Attack Father     Other Father         heart stinets    Psychiatric Disorder Father         mental illiness (unknown)    Other Sister         ovarien cist    Asthma Sister     Asthma Sister     Diabetes Maternal Aunt     Hypertension Maternal Aunt     Diabetes Maternal Grandmother     Other Maternal Grandmother         aneurysm    Alzheimer Maternal Grandmother     Cancer Paternal Grandmother         colon    Hypertension Paternal Grandmother     Stroke Paternal Grandfather     Anesth Problems Maternal Aunt      Social History:   She  reports that she has never smoked. She has never used smokeless tobacco.   Social History     Substance and Sexual Activity   Alcohol Use Yes    Comment: occasionally     Immunization History:  Immunization History   Administered Date(s) Administered    DTaP 09/04/2018    Influenza Vaccine 10/18/2018    MMR 11/14/2018    Pneumococcal Polysaccharide (PPSV-23) 03/07/2018       Review of Systems:   As above included in HPI. Otherwise 11 point review of systems negative including constitutional, skin, HENT, eyes, respiratory, cardiovascular, gastrointestinal, genitourinary, musculoskeletal, endocrine, hematologic, allergy, and neurologic.       Physical:   Visit Vitals  /69   Pulse (!) 109   Temp (!) 96.4 °F (35.8 °C) (Temporal)   Resp 18   Ht 5' 10\" (1.778 m)   Wt 197 lb (89.4 kg)   SpO2 99%   BMI 28.27 kg/m²      Wt Readings from Last 3 Encounters:   04/28/21 197 lb (89.4 kg)   11/12/18 206 lb (93.4 kg)   11/07/18 208 lb 12.8 oz (94.7 kg)         Exam:   Physical Exam  Vitals signs and nursing note reviewed. Constitutional:       Appearance: Normal appearance. She is obese. HENT:      Head: Normocephalic and atraumatic. Right Ear: Tympanic membrane, ear canal and external ear normal.      Left Ear: Tympanic membrane, ear canal and external ear normal.      Nose: Nose normal.      Mouth/Throat:      Mouth: Mucous membranes are moist.   Eyes:      Extraocular Movements: Extraocular movements intact. Pupils: Pupils are equal, round, and reactive to light. Neck:      Musculoskeletal: Normal range of motion and neck supple. Vascular: No carotid bruit. Cardiovascular:      Rate and Rhythm: Normal rate and regular rhythm. Heart sounds: Normal heart sounds. Comments: No edema noted  Pulmonary:      Effort: Pulmonary effort is normal. No respiratory distress. Breath sounds: Normal breath sounds. No wheezing. Abdominal:      General: Abdomen is flat. Palpations: Abdomen is soft. Musculoskeletal: Normal range of motion. Skin:     General: Skin is warm and dry. Neurological:      General: No focal deficit present. Mental Status: She is alert and oriented to person, place, and time.    Psychiatric:         Mood and Affect: Mood normal.         Behavior: Behavior normal.         Judgment: Judgment normal.         Health Maintenance:  Screening: (Yes/No means wether completed or not)   Mammogram: n/a 4/2021   PAP smear: 4/27/2021 Beatriz Lott NP   Colorectal: n/a 4/2021   Depression: 4/2021   DM (HbA1c/FPG): Ordered 4/2021   Hepatitis C: n/a 4/2021   Falls: 4/2021   DEXA: n/a 4/2021   Eye Exam: regular eye exams with  ? (last ? )   Smoking: Never     Vitamin D: 2018 (17.0)   Medicare Wellness: n/a 4/2021  Covid vaccine: No 4/2021      Review of Data:  Labs reviewed: N/A  Will obtain today    Impression:  Patient Active Problem List   Diagnosis Code    Mild intermittent asthma J45.20    Tachycardia R00.0    Vitamin D deficiency E55.9    Hx gestational diabetes Z86.32    Obese body habitus E66.9    Environmental and seasonal allergies J30.89    Dyslipidemia E78.5       Plan:    ICD-10-CM ICD-9-CM    1. Encounter to establish care  Z76.89 V65.8    2. Dyslipidemia  E78.5 272.4 LIPID PANEL   3. Environmental and seasonal allergies  J30.89 477.8    4. Mild intermittent asthma without complication  N50.18 290.43    5. Hx gestational diabetes  Z86.32 V12.21 HEMOGLOBIN A1C WITH EAG   6. Obese body habitus  Y82.5 608.04 METABOLIC PANEL, COMPREHENSIVE      DRUG SCREEN UR - W/ CONFIRM      phentermine (ADIPEX-P) 37.5 mg tablet   7. COVID-19 vaccination not done  Z28.9 V64.00    8. Vitamin deficiency  E56.9 269.2 VITAMIN D, 25 HYDROXY      VITAMIN D, 25 HYDROXY   9. Vitamin D deficiency  E55.9 268.9 ergocalciferol (ERGOCALCIFEROL) 1,250 mcg (50,000 unit) capsule     1. Encounter to establish care. Patient was last seen in this office back in 2018. Patient is transferring to me from their previous provider. I spent no less than 20 minutes reviewing and updating the chart to include previous labs, diagnostics, and specialty notes. Reviewed medication and completed the medication reconciliation with the patient. Reviewed side effects of medications with the patient. Questions were answered and patient verb understanding. Informed patient chronic medication refills will not be given between their scheduled appointments; all refills will be given at the time of their scheduled follow-up appointments. Patient verbalized understanding    2. Dyslipidemia. Noted LDL to be 130 back in 2018. There was brief discussion with patient's previous PCP in reference to initiating statin at that time but patient then became pregnant and was unable to start therapy.   Will obtain lipid level today. 3.  Environmental/seasonal allergies. She continues Zyrtec therapy as needed. 4.  Mild intermittent asthma. Back in 2018 patient utilized albuterol inhaler as needed for intermittent asthma exacerbations. She has not required this medication since 2018. 5.  History of gestational diabetes. Due to this history will obtain A1c today. 6.  Obese body habitus. Patient has struggled with weight despite diet and exercise. Will obtain CMP today to assess kidney function. Obtained UDS and controlled substance agreement form. Informed patient of necessary follow-up appointments if started on phentermine as this is a controlled drug and patient must show weight loss in order to continue this therapy. After reviewing lab results, will E scribe phentermine to patient's requested pharmacy. Patient is in agreement with every 3-month follow-up while on phentermine. Weight management:  BMI is out of normal parameters and plan is as follows: Discussed in great detail on diet, portion control, exercise, avoiding foods high in sugar, carbs and starches, fatty/greasy foods and to eat until satisfied not til full. I have counseled this patient on diet and exercise regimens as well. Patient verbalized understanding. Discussion about modifying behaviors regarding diet and exercise undertaken. Increase activity as tolerated   -Pt is ismotivated   Cut back on carbs and fatty foods  Calorie counting would be beneficial  Options of temporary weight loss medication therapies and appetite suppressant therapies discussed briefly             -Pt accepted. Much education was given to pt ie Phentermine and side effects to monitor for. Instructed pt this medication is a schedule 2 drug and she will need to see me q3m to obtain refills with random UDS. Pt also aware to sign contract for scheduled substance. Pt also instructed not to get pregnant while taking this medication.  If she does get pregnant, she is to stop this medication immediately. Patient verbalized understanding.             -Other weight loss options: Weight Watchers, exercise armani  Discussed weight loss target of 5% over 6-12 months being a realistic goal .  Will reevaluate carmen loss and goals along with management at subsequent visits. 7.  COVID-19 vaccination not done. Spoke with patient in great detail in reference to the Covid vaccine. Instructed pt on how to protect themselves and others from covid ie wear face masks when leaving home, sanitize/wash hands frequently, avoid touching face, cough and sneeze into their elbow. Follow recommendations of their local and state health departments. Answered all questions/concerns from patient. 8. Vit D Def. Last Vit d level 2018 at 17. Pt not taking supplement. Will obtain level today. Follow up 3 months  Labs needed 1 week prior to appt: No    Dr. Paul Talbot, AGNP-C, DNP  [facility]       Level 5:   45 minutes with the patient on counseling, answering questions, and/or coordination of care. Complex medical review of medical history, lab results, and testing. Complicated management plan formulated.

## 2021-04-28 NOTE — LETTER
Name:Viviane Aviles VJR:[birth year] MR #:[id number] Provider Name:Ирина Red DNP *[id number]* BSMG-491 (5/16) Page 1 of 5 Initial [employer] CONTROLLED SUBSTANCE AGREEMENT I may be prescribed medications that are controlled substances as part  of my treatment plan for management of my medical condition(s). The goal of my treatment plan is to maintain and/or improve my health and wellbeing. Because controlled substances have an increased risk of abuse or harm, continual re-evaluation is needed determine if the goals of my treatment plan are being met for my safety and the safety of others. Morro Munoz  am entering into this Controlled Substance Agreement with my provider, Minetta Riedel, DNP at [place] [address] . I understand that successful treatment requires mutual trust and honesty between me and my provider. I understand that there are state and federal laws and regulations which apply to the medications that my provider may prescribe that must be followed. I understand there are risks and benefits ts of taking the medicines that my provider may prescribe. I understand and agree that following this Agreement is necessary in continuing my provider-patient relationship and success of my treatment plan. As a part of my treatment plan, I agree to the following: COMMUNICATION: 
 
1. I will communicate fully with my provider about my medical condition(s), including the effect on my daily life and how well my medications are helping. I will tell my provider all of the medications that I take for any reason, including medications I receive from another health care provider, and will notify my provider about all issues, problems or concerns, including any side effects, which may be related to my medications. I understand that this information allows my provider to adjust my treatment plan to help manage my medical condition.  I understand that this information will become part of my permanent medical record. 2. I will notify my provider if I have a history of alcohol/drug misuse/addiction or if I have had treatment for alcohol/drug addiction in the past, or if I have a new problem with or concern about alcohol/drug use/addiction, because this increases the likelihood of high risk behaviors and may lead to serious medical conditions. 3. Females Only: I will notify my provider if I am or become pregnant, or if I intend to become pregnant, or if I intend to breastfeed. I understand that communication of these issues with my provider is important, due to possible effects my medication could have on an unborn fetus or breastfeeding child. Name:Virgen Spring JSL:0[birth year] MR #:[id number] Provider Name:Elisha Red, MARCO *[id number]* BSMG-491 (5/16) Page 2 of 5 Initial [employer] MISUSE OF MEDICATIONS / DRUGS: 
 
1. I agree to take all controlled substances as prescribed, and will not misuse or abuse any controlled substances prescribed by my provider. For my safety, I will not increase the amount of medicine I take without first talking with and getting permission from my provider. 2. If I have a medical emergency, another health care provider may prescribe me medication. If I seek emergency treatment, I will notify my provider within seventy-two (72) hours. 3. I understand that my provider may discuss my use and/or possible misuse/abuse of controlled substances and alcohol, as appropriate, with any health care provider involved in my care, pharmacist or legal authority. ILLEGAL DRUGS: 
 
1. I will not use illegal drugs of any kind, including but not limited to marijuana, heroin, cocaine, or any prescription drug which is not prescribed to me. DRUG DIVERSION / PRESCRIPTION FRAUD: 
 
1. I will not share, sell, trade, give away, or otherwise misuse my prescriptions or medications.  
 
2. I will not alter any prescriptions provided to me by my provider. SINGLE PROVIDER: 
 
1. I agree that all controlled substances that I take will be prescribed only by my provider (or his/her covering provider) under this Agreement. This agreement does not prevent me from seeking emergency medical treatment or receiving pain management related to a surgery. PROTECTING MEDICATIONS: 
 
1. I am responsible for keeping my prescriptions and medications in a safe and secure place including safeguarding them from loss or theft. I understand that lost, stolen or damaged/destroyed prescriptions or medications will not be replaced. Name:Virgen Tilley KXN:[birth year] MR #:[id number] Provider Name:Ирина Red Jaydon, DNP *[id number]* BSMG-491 (5/16) Page 3 of 5 Initial [employer] PRESCRIPTION RENEWALS/REFILLS: 
 
1. I will follow my controlled substance medication schedule as prescribed by my provider. 2. I understand and agree that I will make any requests for renewals or refills of my prescriptions only at the time of an office visit or during my providers regular office hours subject to the prescription refill requirements of the individual practice. 3. I understand that my provider may not call in prescriptions for controlled substances to my pharmacy. 4. I understand that my provider may adjust or discontinue these medications as deemed appropriate for my medical treatment plan. This Agreement does not guarantee the prescription of controlled medications. 5. I agree that if my medications are adjusted or discontinued, I will properly dispose of any remaining medications. I understand that I will be required to dispose of any remaining controlled medications prior to being provided with any prescriptions for other controlled medications. 6. I understand that the renewal of my prescription depends on my medical condition, my consistent participation, and my adherence with my treatment plan and this Agreement.  
 
7. I understand that if I do not keep an appointment with my provider, I may not receive a renewal or refill for my controlled substance medication. PRESCRIPTION MONITORING / DRUG TESTIN. I understand that my provider may require me to provide urine, saliva or blood for testing at any time. I understand that this testing will be used to monitor for safety and adherence with my treatment plan and this Agreement. 2. I understand that my provider may ask me to provide an observed urine specimen, which means that a nurse or other health care provider may watch me provide urine, and I agree to cooperate if I am asked to provide an observed specimen. 3. I understand that if I do not provide urine, saliva or blood samples within two (2) hours of my providers request, or other timeframe decided by my provider, my treatment plan could be changed, or my prescriptions and medications may be changed or ended. 4. I understand that urine, saliva and blood test results will be a part of my permanent medical record. Name:Virgen Lawrence EAS: MR #:[id number] Provider Name:Kemi Red DNP *[id number]* BSMG-491 () Page 4 of 5 Initial [employer] 5. I understand that my provider is required to obtain a copy of my State Prescription Monitoring Program () Report at any time in order to safely prescribe medications. 6. I will bring all of my prescribed controlled substance medications in their original bottles to all of my scheduled appointments. 7. I understand that my provider may ask me to come to the practice with all of my prescribed medications for a random pill count at any time. I agree to cooperate if I am asked to come in for a random pill count. I understand that if I do not arrive in the timeframe decided by my provider, my treatment plan could be changed, or my prescriptions and medications may be changed or ended.  
 
COOPERATION WITH INVESTIGATIONS: 
 
1. I authorize my provider and my pharmacy to cooperate fully with any local, state, or federal law enforcement agency in the investigation of any possible misuse, sale, or other diversion of my controlled substance prescriptions or medications. RISKS: 
 
 
1. I understand that if I do not adhere to this Agreement in any way, my provider may change my prescriptions, stop prescribing controlled substances or end our provider-patient relationship. 2. If my provider decides to stop prescribing medication, or decides to end our provider-patient relationship,my provider may require that I taper my medications slowly. If necessary, my provider may also provide a prescription for other medications to treat my withdrawal symptoms. UNDERSTANDING THIS AGREEMENT: 
 
I understand that my provider may adjust or stop my prescriptions for controlled substances based on my medical condition and my treatment plan. I understand that this Agreement does not guarantee that I will be prescribed medications or controlled substances. I understand that controlled substances may be just one part 
of my treatment plan. My initial on each page and my signature below shows that I have read each page of this Agreement, I have had an opportunity to ask questions, and all of my questions have been answered to my satisfaction by my provider.  
 
By signing below, I agree to comply with this Agreement, and I understand that if I do not follow the Agreements listed above, my provider may stop 
 
 
 
_________________________________________  Date/Time 4/28/2021 8:31 AM   
             (Patient Signature)

## 2021-04-29 LAB — 25(OH)D3 SERPL-MCNC: 15.9 NG/ML (ref 30–100)

## 2021-04-30 ENCOUNTER — TELEPHONE (OUTPATIENT)
Dept: INTERNAL MEDICINE CLINIC | Age: 36
End: 2021-04-30

## 2021-04-30 RX ORDER — PHENTERMINE HYDROCHLORIDE 37.5 MG/1
37.5 TABLET ORAL
Qty: 30 TAB | Refills: 2 | Status: SHIPPED | OUTPATIENT
Start: 2021-04-30 | End: 2021-07-27 | Stop reason: ALTCHOICE

## 2021-04-30 RX ORDER — ERGOCALCIFEROL 1.25 MG/1
50000 CAPSULE ORAL
Qty: 12 CAP | Refills: 3 | Status: SHIPPED | OUTPATIENT
Start: 2021-04-30

## 2021-04-30 NOTE — TELEPHONE ENCOUNTER
Pt said she thought of another question she needs to ask nurse, was talking with Twyla Cabral earlier.   Please call her at [phone]

## 2021-04-30 NOTE — PROGRESS NOTES
Patient reached and made aware of results and rx sent over to pharmacy per Dr. Conor Ross. Patient verbalized understanding.

## 2021-04-30 NOTE — PROGRESS NOTES
Sharona Otero, please call pt with the following results:  1) Vit D Def. E-scribed Vit D 50,000 units to tPharmacy. Pt to take one per week. When completed pt needs to start taking 5000 units of over-the-counter Vitamin D3 daily for the rest of life. 2) . Goal <100. Review current value and goal related to prevention level, discuss dietary changes such as low saturated fats and low simple carbohydrates and choosing lean proteins such grilled/broiled/baked fish, chicken or turkey. 3) A1c normal. No diabetes  4) UDS neg. Have escribed Phentermine (to assist with weight loss) to pharmacy.    Thank you

## 2021-04-30 NOTE — PROGRESS NOTES
Meena Jay, please call pt with the following results:  1) Vit D Def. E-scribed Vit D 50,000 units to tPharmacy. Pt to take one per week. When completed pt needs to start taking 5000 units of over-the-counter Vitamin D3 daily for the rest of life. 2) . Goal <100. Review current value and goal related to prevention level, discuss dietary changes such as low saturated fats and low simple carbohydrates and choosing lean proteins such grilled/broiled/baked fish, chicken or turkey. 3) A1c normal. No diabetes  4) UDS neg. Have escribed Phentermine (to assist with weight loss) to pharmacy.    Thank you

## 2021-04-30 NOTE — TELEPHONE ENCOUNTER
Patient reached and asked if it was okay to still continue taking her current medications along with her Nuvaring her GYN just recently prescribed. Per Dr. Parth Owens, she can continue taking her medications.

## 2021-07-27 ENCOUNTER — OFFICE VISIT (OUTPATIENT)
Dept: INTERNAL MEDICINE CLINIC | Age: 36
End: 2021-07-27
Payer: COMMERCIAL

## 2021-07-27 VITALS
BODY MASS INDEX: 25.4 KG/M2 | DIASTOLIC BLOOD PRESSURE: 68 MMHG | RESPIRATION RATE: 18 BRPM | SYSTOLIC BLOOD PRESSURE: 109 MMHG | WEIGHT: 177.4 LBS | TEMPERATURE: 97.7 F | HEART RATE: 89 BPM | OXYGEN SATURATION: 100 % | HEIGHT: 70 IN

## 2021-07-27 DIAGNOSIS — E66.9 OBESE BODY HABITUS: Primary | ICD-10-CM

## 2021-07-27 DIAGNOSIS — J30.89 ENVIRONMENTAL AND SEASONAL ALLERGIES: ICD-10-CM

## 2021-07-27 DIAGNOSIS — E55.9 VITAMIN D DEFICIENCY: ICD-10-CM

## 2021-07-27 DIAGNOSIS — E78.5 DYSLIPIDEMIA: ICD-10-CM

## 2021-07-27 PROCEDURE — 99214 OFFICE O/P EST MOD 30 MIN: CPT | Performed by: NURSE PRACTITIONER

## 2021-07-27 NOTE — PROGRESS NOTES
[facility] [address] [facility]   [place], [address]  [phone] TABPRP/[phone] fax    2021    HPI:   Black Khan 1985 is a pleasant BLACK/ female with relevant obesity-has lost weight on phentermine therapy. Is no longer interested in taking this therapy as she believes she is accustomed to eating healthier along with exercise; Asthmano flareups and no treatment necessary,  Cholesterolcontinue to work on diet by reducing fried fatty foods along with exercise;  seasonal allergies-continues over-the-counter antihistamine as needed, PCOS, HPV with Leep procedure ; Vit D Deftaking vitamin D high-dose and tolerating well. She will be moving to [facility] next week. Her significant other obtained a promotion for [employer] which is relocating them back to [facility]. She is returning home to her family. Past Medical History:   Diagnosis Date    Dyslipidemia 2021    Environmental and seasonal allergies 2021    Hirsutism 2018    History of abnormal cervical Pap smear 2018    Human papilloma virus 2010    Hx gestational diabetes 2021    Mild intermittent asthma 2018    Obese body habitus 2021    PCOS (polycystic ovarian syndrome)     Tachycardia 3/7/2018    Vitamin D insufficiency 3/7/2018     Past Surgical History:   Procedure Laterality Date    HX GYN  2015        HX WISDOM TEETH EXTRACTION       Current Outpatient Medications   Medication Sig    ergocalciferol (ERGOCALCIFEROL) 1,250 mcg (50,000 unit) capsule Take 1 Cap by mouth every seven (7) days. Indications: vitamin D deficiency (high dose therapy)    Cetirizine (ZYRTEC) 10 mg cap Take  by mouth. No current facility-administered medications for this visit. Allergies and Intolerances:    Allergies   Allergen Reactions    Metformin Diarrhea    Naproxen Other (comments)     Hard Build up under skin Family History:   Family History   Problem Relation Age of Onset    Osteoporosis Mother     Heart Attack Father     Other Father         heart stinets    Psychiatric Disorder Father         mental illiness (unknown)    Other Sister         ovarien cist    Asthma Sister     Asthma Sister     Diabetes Maternal Aunt     Hypertension Maternal Aunt     Diabetes Maternal Grandmother     Other Maternal Grandmother         aneurysm    Alzheimer Maternal Grandmother     Cancer Paternal Grandmother         colon    Hypertension Paternal Grandmother     Stroke Paternal Grandfather     Anesth Problems Maternal Aunt      Social History:   She  reports that she has never smoked. She has never used smokeless tobacco.   Social History     Substance and Sexual Activity   Alcohol Use Yes    Comment: occasionally     Immunization History:  Immunization History   Administered Date(s) Administered    DTaP 09/04/2018    Influenza Vaccine 10/18/2018    MMR 11/14/2018    Pneumococcal Polysaccharide (PPSV-23) 03/07/2018       Review of Systems:   As above included in HPI. Otherwise 11 point review of systems negative including constitutional, skin, HENT, eyes, respiratory, cardiovascular, gastrointestinal, genitourinary, musculoskeletal, endocrine, hematologic, allergy, and neurologic. Physical:   Visit Vitals  /68   Pulse 89   Temp 97.7 °F (36.5 °C) (Temporal)   Resp 18   Ht 5' 10\" (1.778 m)   Wt 177 lb 6.4 oz (80.5 kg)   SpO2 100%   BMI 25.45 kg/m²      Wt Readings from Last 3 Encounters:   07/27/21 177 lb 6.4 oz (80.5 kg)   04/28/21 197 lb (89.4 kg)   11/12/18 206 lb (93.4 kg)         Exam:   Physical Exam  Constitutional:       Appearance: Normal appearance. HENT:      Head: Normocephalic and atraumatic. Right Ear: External ear normal.      Left Ear: External ear normal.   Eyes:      Extraocular Movements: Extraocular movements intact.       Conjunctiva/sclera: Conjunctivae normal.   Neck: Vascular: No carotid bruit. Cardiovascular:      Rate and Rhythm: Normal rate and regular rhythm. Pulses: Normal pulses. Heart sounds: Normal heart sounds. Comments: No edema noted to artemio LEs  Pulmonary:      Effort: Pulmonary effort is normal. No respiratory distress. Breath sounds: Normal breath sounds. No wheezing. Musculoskeletal:         General: Normal range of motion. Cervical back: Normal range of motion. Comments: Gait stable   Skin:     General: Skin is warm and dry. Neurological:      General: No focal deficit present. Mental Status: She is alert and oriented to person, place, and time. Psychiatric:         Mood and Affect: Mood normal.         Behavior: Behavior normal.          Review of Data:  Labs reviewed: N/A    Impression:  Patient Active Problem List   Diagnosis Code    Mild intermittent asthma J45.20    Tachycardia R00.0    Vitamin D deficiency E55.9    Hx gestational diabetes Z86.32    Obese body habitus E66.9    Environmental and seasonal allergies J30.89    Dyslipidemia E78.5       Plan:    ICD-10-CM ICD-9-CM    1. Obese body habitus  E66.9 278.00    2. Vitamin D deficiency  E55.9 268.9    3. Environmental and seasonal allergies  J30.89 477.8    4. Dyslipidemia  E78.5 272.4      Obese body habitus  DC'd phentermine as no longer needed  20 pounds weight loss 3 months  Continue healthy diet with exercise    Vitamin D deficiency  Continue high-dose vitamin D supplement to include 3 refills    Seasonal allergies  Continue Zyrtec as needed    Dyslipidemia  In April 2021 LDL level 143  Continue to work on reducing fried fatty foods in diet  Continue exercise regimen    Reviewed medication and completed medication reconciliation with the patient. Reviewed side effects of medications with the patient. Questions were answered and patient verb understanding.       Follow up n/a patient is moving to [facility]         KIM Cerda, DNP  [facility]

## 2021-07-27 NOTE — PROGRESS NOTES
Chief Complaint   Patient presents with    Weight Management     3 mo f/u       1. Have you been to the ER, urgent care clinic since your last visit? Hospitalized since your last visit? No    2. Have you seen or consulted any other health care providers outside of the [address] [place] [address] since your last visit? Include any pap smears or colon screening.  No